# Patient Record
Sex: MALE | Race: WHITE | NOT HISPANIC OR LATINO | Employment: OTHER | URBAN - METROPOLITAN AREA
[De-identification: names, ages, dates, MRNs, and addresses within clinical notes are randomized per-mention and may not be internally consistent; named-entity substitution may affect disease eponyms.]

---

## 2024-02-27 ENCOUNTER — APPOINTMENT (OUTPATIENT)
Dept: CT IMAGING | Facility: HOSPITAL | Age: 79
DRG: 291 | End: 2024-02-27
Payer: OTHER GOVERNMENT

## 2024-02-27 ENCOUNTER — APPOINTMENT (OUTPATIENT)
Dept: GENERAL RADIOLOGY | Facility: HOSPITAL | Age: 79
DRG: 291 | End: 2024-02-27
Payer: OTHER GOVERNMENT

## 2024-02-27 ENCOUNTER — APPOINTMENT (OUTPATIENT)
Dept: CARDIOLOGY | Facility: HOSPITAL | Age: 79
DRG: 291 | End: 2024-02-27
Payer: OTHER GOVERNMENT

## 2024-02-27 ENCOUNTER — HOSPITAL ENCOUNTER (INPATIENT)
Facility: HOSPITAL | Age: 79
LOS: 3 days | Discharge: HOME | DRG: 291 | End: 2024-03-01
Attending: EMERGENCY MEDICINE | Admitting: HOSPITALIST
Payer: OTHER GOVERNMENT

## 2024-02-27 DIAGNOSIS — I50.9 CONGESTIVE HEART FAILURE, UNSPECIFIED HF CHRONICITY, UNSPECIFIED HEART FAILURE TYPE: Primary | ICD-10-CM

## 2024-02-27 PROBLEM — F10.10 ALCOHOL ABUSE: Status: ACTIVE | Noted: 2024-02-27

## 2024-02-27 PROBLEM — I25.10 CORONARY ARTERY DISEASE: Status: ACTIVE | Noted: 2024-02-27

## 2024-02-27 PROBLEM — N17.9 AKI (ACUTE KIDNEY INJURY): Status: ACTIVE | Noted: 2024-02-27

## 2024-02-27 PROBLEM — I10 HYPERTENSION: Status: ACTIVE | Noted: 2024-02-27

## 2024-02-27 LAB
ALBUMIN SERPL-MCNC: 4.4 G/DL (ref 3.5–5.2)
ALBUMIN/GLOB SERPL: 1.6 G/DL
ALP SERPL-CCNC: 90 U/L (ref 39–117)
ALT SERPL W P-5'-P-CCNC: 13 U/L (ref 1–41)
ANION GAP SERPL CALCULATED.3IONS-SCNC: 15.9 MMOL/L (ref 5–15)
AORTIC ARCH: 3.4 CM
AORTIC DIMENSIONLESS INDEX: 0.9 (DI)
ASCENDING AORTA: 3.7 CM
AST SERPL-CCNC: 14 U/L (ref 1–40)
B PARAPERT DNA SPEC QL NAA+PROBE: NOT DETECTED
B PERT DNA SPEC QL NAA+PROBE: NOT DETECTED
BASOPHILS # BLD AUTO: 0.12 10*3/MM3 (ref 0–0.2)
BASOPHILS NFR BLD AUTO: 0.9 % (ref 0–1.5)
BH CV ECHO MEAS - ACS: 1.35 CM
BH CV ECHO MEAS - AO MAX PG: 4.3 MMHG
BH CV ECHO MEAS - AO MEAN PG: 2.7 MMHG
BH CV ECHO MEAS - AO ROOT DIAM: 3.3 CM
BH CV ECHO MEAS - AO V2 MAX: 104.1 CM/SEC
BH CV ECHO MEAS - AO V2 VTI: 14.9 CM
BH CV ECHO MEAS - AVA(I,D): 3 CM2
BH CV ECHO MEAS - EDV(CUBED): 186.3 ML
BH CV ECHO MEAS - EDV(MOD-SP2): 121 ML
BH CV ECHO MEAS - EDV(MOD-SP4): 228 ML
BH CV ECHO MEAS - EF(MOD-BP): 35 %
BH CV ECHO MEAS - EF(MOD-SP2): 21.5 %
BH CV ECHO MEAS - EF(MOD-SP4): 34.6 %
BH CV ECHO MEAS - ESV(CUBED): 135.7 ML
BH CV ECHO MEAS - ESV(MOD-SP2): 95 ML
BH CV ECHO MEAS - ESV(MOD-SP4): 149 ML
BH CV ECHO MEAS - FS: 10 %
BH CV ECHO MEAS - IVS/LVPW: 1.02 CM
BH CV ECHO MEAS - IVSD: 1.24 CM
BH CV ECHO MEAS - LA DIMENSION: 4 CM
BH CV ECHO MEAS - LAT PEAK E' VEL: 12.9 CM/SEC
BH CV ECHO MEAS - LV DIASTOLIC VOL/BSA (35-75): 108.1 CM2
BH CV ECHO MEAS - LV MASS(C)D: 298.4 GRAMS
BH CV ECHO MEAS - LV MAX PG: 2.6 MMHG
BH CV ECHO MEAS - LV MEAN PG: 1.32 MMHG
BH CV ECHO MEAS - LV SYSTOLIC VOL/BSA (12-30): 70.7 CM2
BH CV ECHO MEAS - LV V1 MAX: 80.7 CM/SEC
BH CV ECHO MEAS - LV V1 VTI: 13.4 CM
BH CV ECHO MEAS - LVIDD: 5.7 CM
BH CV ECHO MEAS - LVIDS: 5.1 CM
BH CV ECHO MEAS - LVOT AREA: 3.3 CM2
BH CV ECHO MEAS - LVOT DIAM: 2.05 CM
BH CV ECHO MEAS - LVPWD: 1.21 CM
BH CV ECHO MEAS - MED PEAK E' VEL: 4.5 CM/SEC
BH CV ECHO MEAS - MR MAX PG: 66.8 MMHG
BH CV ECHO MEAS - MR MAX VEL: 408.7 CM/SEC
BH CV ECHO MEAS - MV DEC SLOPE: 718.6 CM/SEC2
BH CV ECHO MEAS - MV DEC TIME: 0.1 SEC
BH CV ECHO MEAS - MV E MAX VEL: 105 CM/SEC
BH CV ECHO MEAS - MV MAX PG: 5.4 MMHG
BH CV ECHO MEAS - MV MEAN PG: 3.6 MMHG
BH CV ECHO MEAS - MV P1/2T: 55.5 MSEC
BH CV ECHO MEAS - MV V2 VTI: 15.1 CM
BH CV ECHO MEAS - MVA(P1/2T): 4 CM2
BH CV ECHO MEAS - MVA(VTI): 2.9 CM2
BH CV ECHO MEAS - PA ACC TIME: 0.13 SEC
BH CV ECHO MEAS - PA V2 MAX: 73.8 CM/SEC
BH CV ECHO MEAS - QP/QS: 3.1
BH CV ECHO MEAS - RV MAX PG: 2.6 MMHG
BH CV ECHO MEAS - RV V1 MAX: 80.2 CM/SEC
BH CV ECHO MEAS - RV V1 VTI: 15.6 CM
BH CV ECHO MEAS - RVOT DIAM: 3.4 CM
BH CV ECHO MEAS - SI(MOD-SP2): 12.3 ML/M2
BH CV ECHO MEAS - SI(MOD-SP4): 37.5 ML/M2
BH CV ECHO MEAS - SUP REN AO DIAM: 2.4 CM
BH CV ECHO MEAS - SV(LVOT): 44.3 ML
BH CV ECHO MEAS - SV(MOD-SP2): 26 ML
BH CV ECHO MEAS - SV(MOD-SP4): 79 ML
BH CV ECHO MEAS - SV(RVOT): 138.3 ML
BH CV ECHO MEAS - TAPSE (>1.6): 2.2 CM
BH CV ECHO MEASUREMENTS AVERAGE E/E' RATIO: 12.07
BH CV XLRA - RV BASE: 3.4 CM
BH CV XLRA - RV LENGTH: 7.8 CM
BH CV XLRA - RV MID: 2.9 CM
BH CV XLRA - TDI S': 14.9 CM/SEC
BILIRUB SERPL-MCNC: 0.6 MG/DL (ref 0–1.2)
BUN SERPL-MCNC: 21 MG/DL (ref 8–23)
BUN/CREAT SERPL: 12.7 (ref 7–25)
C PNEUM DNA NPH QL NAA+NON-PROBE: NOT DETECTED
CALCIUM SPEC-SCNC: 9.4 MG/DL (ref 8.6–10.5)
CHLORIDE SERPL-SCNC: 100 MMOL/L (ref 98–107)
CHOLEST SERPL-MCNC: 241 MG/DL (ref 0–200)
CO2 SERPL-SCNC: 23.1 MMOL/L (ref 22–29)
CREAT SERPL-MCNC: 1.65 MG/DL (ref 0.76–1.27)
D DIMER PPP FEU-MCNC: 1.86 MCGFEU/ML (ref 0–0.78)
D-LACTATE SERPL-SCNC: 1.9 MMOL/L (ref 0.5–2)
DEPRECATED RDW RBC AUTO: 50.5 FL (ref 37–54)
EGFRCR SERPLBLD CKD-EPI 2021: 42.2 ML/MIN/1.73
EOSINOPHIL # BLD AUTO: 0.09 10*3/MM3 (ref 0–0.4)
EOSINOPHIL NFR BLD AUTO: 0.7 % (ref 0.3–6.2)
ERYTHROCYTE [DISTWIDTH] IN BLOOD BY AUTOMATED COUNT: 20.6 % (ref 12.3–15.4)
ETHANOL BLD-MCNC: <10 MG/DL (ref 0–10)
ETHANOL UR QL: <0.01 %
FLUAV RNA RESP QL NAA+PROBE: NOT DETECTED
FLUAV SUBTYP SPEC NAA+PROBE: NOT DETECTED
FLUBV RNA ISLT QL NAA+PROBE: NOT DETECTED
FLUBV RNA RESP QL NAA+PROBE: NOT DETECTED
GEN 5 2HR TROPONIN T REFLEX: 54 NG/L
GLOBULIN UR ELPH-MCNC: 2.8 GM/DL
GLUCOSE BLDC GLUCOMTR-MCNC: 226 MG/DL (ref 70–130)
GLUCOSE BLDC GLUCOMTR-MCNC: 471 MG/DL (ref 70–130)
GLUCOSE SERPL-MCNC: 158 MG/DL (ref 65–99)
HADV DNA SPEC NAA+PROBE: NOT DETECTED
HBA1C MFR BLD: 6.1 % (ref 4.8–5.6)
HCOV 229E RNA SPEC QL NAA+PROBE: NOT DETECTED
HCOV HKU1 RNA SPEC QL NAA+PROBE: NOT DETECTED
HCOV NL63 RNA SPEC QL NAA+PROBE: NOT DETECTED
HCOV OC43 RNA SPEC QL NAA+PROBE: NOT DETECTED
HCT VFR BLD AUTO: 51.8 % (ref 37.5–51)
HDLC SERPL-MCNC: 42 MG/DL (ref 40–60)
HGB BLD-MCNC: 15.4 G/DL (ref 13–17.7)
HMPV RNA NPH QL NAA+NON-PROBE: NOT DETECTED
HPIV1 RNA ISLT QL NAA+PROBE: NOT DETECTED
HPIV2 RNA SPEC QL NAA+PROBE: NOT DETECTED
HPIV3 RNA NPH QL NAA+PROBE: NOT DETECTED
HPIV4 P GENE NPH QL NAA+PROBE: NOT DETECTED
IMM GRANULOCYTES # BLD AUTO: 0.14 10*3/MM3 (ref 0–0.05)
IMM GRANULOCYTES NFR BLD AUTO: 1.1 % (ref 0–0.5)
LDLC SERPL CALC-MCNC: 171 MG/DL (ref 0–100)
LDLC/HDLC SERPL: 4 {RATIO}
LEFT ATRIUM VOLUME INDEX: 32.7 ML/M2
LYMPHOCYTES # BLD AUTO: 0.93 10*3/MM3 (ref 0.7–3.1)
LYMPHOCYTES NFR BLD AUTO: 7 % (ref 19.6–45.3)
M PNEUMO IGG SER IA-ACNC: NOT DETECTED
MAGNESIUM SERPL-MCNC: 1.1 MG/DL (ref 1.6–2.4)
MCH RBC QN AUTO: 22.6 PG (ref 26.6–33)
MCHC RBC AUTO-ENTMCNC: 29.7 G/DL (ref 31.5–35.7)
MCV RBC AUTO: 76.2 FL (ref 79–97)
MONOCYTES # BLD AUTO: 0.8 10*3/MM3 (ref 0.1–0.9)
MONOCYTES NFR BLD AUTO: 6 % (ref 5–12)
NEUTROPHILS NFR BLD AUTO: 11.2 10*3/MM3 (ref 1.7–7)
NEUTROPHILS NFR BLD AUTO: 84.3 % (ref 42.7–76)
NRBC BLD AUTO-RTO: 0 /100 WBC (ref 0–0.2)
NT-PROBNP SERPL-MCNC: ABNORMAL PG/ML (ref 0–1800)
PLATELET # BLD AUTO: 383 10*3/MM3 (ref 140–450)
PMV BLD AUTO: 11.3 FL (ref 6–12)
POTASSIUM SERPL-SCNC: 3.8 MMOL/L (ref 3.5–5.2)
PROCALCITONIN SERPL-MCNC: 0.05 NG/ML (ref 0–0.25)
PROT SERPL-MCNC: 7.2 G/DL (ref 6–8.5)
QT INTERVAL: 324 MS
QTC INTERVAL: 462 MS
RBC # BLD AUTO: 6.8 10*6/MM3 (ref 4.14–5.8)
RHINOVIRUS RNA SPEC NAA+PROBE: NOT DETECTED
RSV RNA NPH QL NAA+NON-PROBE: NOT DETECTED
S PYO AG THROAT QL: NEGATIVE
SARS-COV-2 RNA NPH QL NAA+NON-PROBE: NOT DETECTED
SARS-COV-2 RNA RESP QL NAA+PROBE: NOT DETECTED
SINUS: 3.1 CM
SODIUM SERPL-SCNC: 139 MMOL/L (ref 136–145)
STJ: 3.1 CM
TRIGL SERPL-MCNC: 154 MG/DL (ref 0–150)
TROPONIN T DELTA: -11 NG/L
TROPONIN T SERPL HS-MCNC: 65 NG/L
TSH SERPL DL<=0.05 MIU/L-ACNC: 1.77 UIU/ML (ref 0.27–4.2)
VLDLC SERPL-MCNC: 28 MG/DL (ref 5–40)
WBC NRBC COR # BLD AUTO: 13.28 10*3/MM3 (ref 3.4–10.8)

## 2024-02-27 PROCEDURE — 63710000001 INSULIN DETEMIR PER 5 UNITS: Performed by: HOSPITALIST

## 2024-02-27 PROCEDURE — 93306 TTE W/DOPPLER COMPLETE: CPT | Performed by: INTERNAL MEDICINE

## 2024-02-27 PROCEDURE — G0378 HOSPITAL OBSERVATION PER HR: HCPCS

## 2024-02-27 PROCEDURE — 25510000001 IOPAMIDOL PER 1 ML: Performed by: EMERGENCY MEDICINE

## 2024-02-27 PROCEDURE — 87636 SARSCOV2 & INF A&B AMP PRB: CPT | Performed by: EMERGENCY MEDICINE

## 2024-02-27 PROCEDURE — 80061 LIPID PANEL: CPT | Performed by: HOSPITALIST

## 2024-02-27 PROCEDURE — 93005 ELECTROCARDIOGRAM TRACING: CPT | Performed by: EMERGENCY MEDICINE

## 2024-02-27 PROCEDURE — 84443 ASSAY THYROID STIM HORMONE: CPT | Performed by: HOSPITALIST

## 2024-02-27 PROCEDURE — 25010000002 FUROSEMIDE PER 20 MG: Performed by: EMERGENCY MEDICINE

## 2024-02-27 PROCEDURE — 85379 FIBRIN DEGRADATION QUANT: CPT | Performed by: EMERGENCY MEDICINE

## 2024-02-27 PROCEDURE — 0202U NFCT DS 22 TRGT SARS-COV-2: CPT | Performed by: EMERGENCY MEDICINE

## 2024-02-27 PROCEDURE — 99222 1ST HOSP IP/OBS MODERATE 55: CPT | Performed by: HOSPITALIST

## 2024-02-27 PROCEDURE — 94640 AIRWAY INHALATION TREATMENT: CPT

## 2024-02-27 PROCEDURE — 87040 BLOOD CULTURE FOR BACTERIA: CPT | Performed by: EMERGENCY MEDICINE

## 2024-02-27 PROCEDURE — 93010 ELECTROCARDIOGRAM REPORT: CPT | Performed by: INTERNAL MEDICINE

## 2024-02-27 PROCEDURE — 87880 STREP A ASSAY W/OPTIC: CPT | Performed by: EMERGENCY MEDICINE

## 2024-02-27 PROCEDURE — 36415 COLL VENOUS BLD VENIPUNCTURE: CPT

## 2024-02-27 PROCEDURE — 82948 REAGENT STRIP/BLOOD GLUCOSE: CPT

## 2024-02-27 PROCEDURE — 83036 HEMOGLOBIN GLYCOSYLATED A1C: CPT | Performed by: HOSPITALIST

## 2024-02-27 PROCEDURE — 83735 ASSAY OF MAGNESIUM: CPT | Performed by: HOSPITALIST

## 2024-02-27 PROCEDURE — 83880 ASSAY OF NATRIURETIC PEPTIDE: CPT | Performed by: EMERGENCY MEDICINE

## 2024-02-27 PROCEDURE — 84484 ASSAY OF TROPONIN QUANT: CPT | Performed by: EMERGENCY MEDICINE

## 2024-02-27 PROCEDURE — 82077 ASSAY SPEC XCP UR&BREATH IA: CPT | Performed by: HOSPITALIST

## 2024-02-27 PROCEDURE — 25010000002 DIAZEPAM PER 5 MG: Performed by: HOSPITALIST

## 2024-02-27 PROCEDURE — 93306 TTE W/DOPPLER COMPLETE: CPT

## 2024-02-27 PROCEDURE — 25010000002 THIAMINE HCL 200 MG/2ML SOLUTION: Performed by: HOSPITALIST

## 2024-02-27 PROCEDURE — 87081 CULTURE SCREEN ONLY: CPT | Performed by: EMERGENCY MEDICINE

## 2024-02-27 PROCEDURE — 25010000002 FUROSEMIDE PER 20 MG: Performed by: INTERNAL MEDICINE

## 2024-02-27 PROCEDURE — 25010000002 METHYLPREDNISOLONE PER 125 MG: Performed by: EMERGENCY MEDICINE

## 2024-02-27 PROCEDURE — 83605 ASSAY OF LACTIC ACID: CPT | Performed by: EMERGENCY MEDICINE

## 2024-02-27 PROCEDURE — 80053 COMPREHEN METABOLIC PANEL: CPT | Performed by: EMERGENCY MEDICINE

## 2024-02-27 PROCEDURE — 85025 COMPLETE CBC W/AUTO DIFF WBC: CPT | Performed by: EMERGENCY MEDICINE

## 2024-02-27 PROCEDURE — 71045 X-RAY EXAM CHEST 1 VIEW: CPT

## 2024-02-27 PROCEDURE — 63710000001 INSULIN ASPART PER 5 UNITS: Performed by: HOSPITALIST

## 2024-02-27 PROCEDURE — 25010000002 MAGNESIUM SULFATE 4 GM/100ML SOLUTION: Performed by: HOSPITALIST

## 2024-02-27 PROCEDURE — 99204 OFFICE O/P NEW MOD 45 MIN: CPT | Performed by: INTERNAL MEDICINE

## 2024-02-27 PROCEDURE — 99285 EMERGENCY DEPT VISIT HI MDM: CPT

## 2024-02-27 PROCEDURE — 84145 PROCALCITONIN (PCT): CPT | Performed by: EMERGENCY MEDICINE

## 2024-02-27 PROCEDURE — 25510000001 PERFLUTREN (DEFINITY) 8.476 MG IN SODIUM CHLORIDE (PF) 0.9 % 10 ML INJECTION: Performed by: HOSPITALIST

## 2024-02-27 PROCEDURE — 71275 CT ANGIOGRAPHY CHEST: CPT

## 2024-02-27 RX ORDER — MAGNESIUM SULFATE HEPTAHYDRATE 40 MG/ML
4 INJECTION, SOLUTION INTRAVENOUS ONCE
Status: COMPLETED | OUTPATIENT
Start: 2024-02-27 | End: 2024-02-27

## 2024-02-27 RX ORDER — DEXTROSE MONOHYDRATE 25 G/50ML
10-50 INJECTION, SOLUTION INTRAVENOUS
Status: DISCONTINUED | OUTPATIENT
Start: 2024-02-27 | End: 2024-03-01 | Stop reason: HOSPADM

## 2024-02-27 RX ORDER — NICOTINE POLACRILEX 4 MG
15 LOZENGE BUCCAL
Status: DISCONTINUED | OUTPATIENT
Start: 2024-02-27 | End: 2024-03-01 | Stop reason: HOSPADM

## 2024-02-27 RX ORDER — FLUOXETINE 10 MG/1
30 CAPSULE ORAL DAILY
COMMUNITY
End: 2024-03-01 | Stop reason: HOSPADM

## 2024-02-27 RX ORDER — ATORVASTATIN CALCIUM 40 MG/1
80 TABLET, FILM COATED ORAL NIGHTLY
Status: DISCONTINUED | OUTPATIENT
Start: 2024-02-27 | End: 2024-03-01 | Stop reason: HOSPADM

## 2024-02-27 RX ORDER — FLUOXETINE HYDROCHLORIDE 20 MG/1
60 CAPSULE ORAL DAILY
Status: DISCONTINUED | OUTPATIENT
Start: 2024-02-28 | End: 2024-03-01 | Stop reason: HOSPADM

## 2024-02-27 RX ORDER — LEVETIRACETAM 500 MG/1
500 TABLET ORAL 2 TIMES DAILY
COMMUNITY

## 2024-02-27 RX ORDER — ENOXAPARIN SODIUM 100 MG/ML
40 INJECTION SUBCUTANEOUS NIGHTLY
Status: DISCONTINUED | OUTPATIENT
Start: 2024-02-27 | End: 2024-02-27

## 2024-02-27 RX ORDER — METHYLPREDNISOLONE SODIUM SUCCINATE 125 MG/2ML
125 INJECTION, POWDER, LYOPHILIZED, FOR SOLUTION INTRAMUSCULAR; INTRAVENOUS ONCE
Status: COMPLETED | OUTPATIENT
Start: 2024-02-27 | End: 2024-02-27

## 2024-02-27 RX ORDER — LORAZEPAM 1 MG/1
2 TABLET ORAL
Status: DISCONTINUED | OUTPATIENT
Start: 2024-02-27 | End: 2024-03-01 | Stop reason: HOSPADM

## 2024-02-27 RX ORDER — ALLOPURINOL 100 MG/1
200 TABLET ORAL DAILY
Status: DISCONTINUED | OUTPATIENT
Start: 2024-02-28 | End: 2024-03-01 | Stop reason: HOSPADM

## 2024-02-27 RX ORDER — MIDAZOLAM HYDROCHLORIDE 2 MG/2ML
2 INJECTION, SOLUTION INTRAMUSCULAR; INTRAVENOUS
Status: DISCONTINUED | OUTPATIENT
Start: 2024-02-27 | End: 2024-03-01 | Stop reason: HOSPADM

## 2024-02-27 RX ORDER — LEVOTHYROXINE SODIUM 0.07 MG/1
75 TABLET ORAL
Status: DISCONTINUED | OUTPATIENT
Start: 2024-02-28 | End: 2024-03-01 | Stop reason: HOSPADM

## 2024-02-27 RX ORDER — ATORVASTATIN CALCIUM 80 MG/1
80 TABLET, FILM COATED ORAL DAILY
COMMUNITY

## 2024-02-27 RX ORDER — PANTOPRAZOLE SODIUM 40 MG/1
40 TABLET, DELAYED RELEASE ORAL
Status: DISCONTINUED | OUTPATIENT
Start: 2024-02-28 | End: 2024-03-01 | Stop reason: HOSPADM

## 2024-02-27 RX ORDER — THIAMINE HYDROCHLORIDE 100 MG/ML
200 INJECTION, SOLUTION INTRAMUSCULAR; INTRAVENOUS EVERY 8 HOURS SCHEDULED
Status: DISCONTINUED | OUTPATIENT
Start: 2024-02-27 | End: 2024-03-01 | Stop reason: HOSPADM

## 2024-02-27 RX ORDER — FUROSEMIDE 10 MG/ML
40 INJECTION INTRAMUSCULAR; INTRAVENOUS ONCE
Status: COMPLETED | OUTPATIENT
Start: 2024-02-27 | End: 2024-02-27

## 2024-02-27 RX ORDER — LORAZEPAM 1 MG/1
1 TABLET ORAL
Status: DISCONTINUED | OUTPATIENT
Start: 2024-02-27 | End: 2024-03-01 | Stop reason: HOSPADM

## 2024-02-27 RX ORDER — MIDAZOLAM HYDROCHLORIDE 2 MG/2ML
4 INJECTION, SOLUTION INTRAMUSCULAR; INTRAVENOUS
Status: DISCONTINUED | OUTPATIENT
Start: 2024-02-27 | End: 2024-03-01 | Stop reason: HOSPADM

## 2024-02-27 RX ORDER — GABAPENTIN 300 MG/1
300 CAPSULE ORAL EVERY 8 HOURS SCHEDULED
Status: DISCONTINUED | OUTPATIENT
Start: 2024-02-27 | End: 2024-03-01 | Stop reason: HOSPADM

## 2024-02-27 RX ORDER — COLCHICINE 0.6 MG/1
0.6 TABLET ORAL DAILY
COMMUNITY
End: 2024-03-01 | Stop reason: HOSPADM

## 2024-02-27 RX ORDER — LEVOTHYROXINE SODIUM 0.07 MG/1
75 TABLET ORAL DAILY
COMMUNITY

## 2024-02-27 RX ORDER — GLIPIZIDE 5 MG/1
5 TABLET ORAL DAILY
COMMUNITY

## 2024-02-27 RX ORDER — SODIUM CHLORIDE 0.9 % (FLUSH) 0.9 %
10 SYRINGE (ML) INJECTION AS NEEDED
Status: CANCELLED | OUTPATIENT
Start: 2024-02-27

## 2024-02-27 RX ORDER — LISINOPRIL 20 MG/1
20 TABLET ORAL DAILY
Status: ON HOLD | COMMUNITY
End: 2024-03-01 | Stop reason: SDUPTHER

## 2024-02-27 RX ORDER — CLOPIDOGREL BISULFATE 75 MG/1
75 TABLET ORAL DAILY
COMMUNITY

## 2024-02-27 RX ORDER — CLOPIDOGREL BISULFATE 75 MG/1
75 TABLET ORAL DAILY
Status: DISCONTINUED | OUTPATIENT
Start: 2024-02-28 | End: 2024-03-01 | Stop reason: HOSPADM

## 2024-02-27 RX ORDER — OMEPRAZOLE 20 MG/1
20 CAPSULE, DELAYED RELEASE ORAL DAILY
COMMUNITY
End: 2024-03-01 | Stop reason: HOSPADM

## 2024-02-27 RX ORDER — IPRATROPIUM BROMIDE AND ALBUTEROL SULFATE 2.5; .5 MG/3ML; MG/3ML
3 SOLUTION RESPIRATORY (INHALATION) ONCE
Status: COMPLETED | OUTPATIENT
Start: 2024-02-27 | End: 2024-02-27

## 2024-02-27 RX ORDER — LISINOPRIL 20 MG/1
20 TABLET ORAL
Status: DISCONTINUED | OUTPATIENT
Start: 2024-02-28 | End: 2024-02-29

## 2024-02-27 RX ORDER — HYDROCODONE BITARTRATE AND ACETAMINOPHEN 10; 325 MG/1; MG/1
1 TABLET ORAL EVERY 6 HOURS PRN
Status: DISCONTINUED | OUTPATIENT
Start: 2024-02-27 | End: 2024-03-01 | Stop reason: HOSPADM

## 2024-02-27 RX ORDER — LEVETIRACETAM 500 MG/1
500 TABLET ORAL EVERY 12 HOURS SCHEDULED
Status: DISCONTINUED | OUTPATIENT
Start: 2024-02-27 | End: 2024-03-01 | Stop reason: HOSPADM

## 2024-02-27 RX ORDER — ALLOPURINOL 100 MG/1
100 TABLET ORAL DAILY
COMMUNITY
End: 2024-03-01 | Stop reason: HOSPADM

## 2024-02-27 RX ORDER — FUROSEMIDE 10 MG/ML
80 INJECTION INTRAMUSCULAR; INTRAVENOUS ONCE
Status: COMPLETED | OUTPATIENT
Start: 2024-02-27 | End: 2024-02-27

## 2024-02-27 RX ORDER — LORAZEPAM 1 MG/1
2 TABLET ORAL EVERY 6 HOURS
Status: COMPLETED | OUTPATIENT
Start: 2024-02-27 | End: 2024-02-28

## 2024-02-27 RX ORDER — DIAZEPAM 5 MG/ML
10 INJECTION, SOLUTION INTRAMUSCULAR; INTRAVENOUS ONCE
Status: COMPLETED | OUTPATIENT
Start: 2024-02-27 | End: 2024-02-27

## 2024-02-27 RX ORDER — INSULIN ASPART 100 [IU]/ML
1-200 INJECTION, SOLUTION INTRAVENOUS; SUBCUTANEOUS
Status: DISCONTINUED | OUTPATIENT
Start: 2024-02-27 | End: 2024-03-01 | Stop reason: HOSPADM

## 2024-02-27 RX ORDER — NITROGLYCERIN 0.4 MG/1
0.4 TABLET SUBLINGUAL
Status: DISCONTINUED | OUTPATIENT
Start: 2024-02-27 | End: 2024-03-01 | Stop reason: HOSPADM

## 2024-02-27 RX ORDER — GABAPENTIN 300 MG/1
300 CAPSULE ORAL 3 TIMES DAILY
COMMUNITY

## 2024-02-27 RX ORDER — GLIPIZIDE 5 MG/1
5 TABLET ORAL
Status: DISCONTINUED | OUTPATIENT
Start: 2024-02-28 | End: 2024-03-01 | Stop reason: HOSPADM

## 2024-02-27 RX ORDER — FOLIC ACID 1 MG/1
1 TABLET ORAL DAILY
Status: DISCONTINUED | OUTPATIENT
Start: 2024-02-27 | End: 2024-03-01 | Stop reason: HOSPADM

## 2024-02-27 RX ORDER — IBUPROFEN 600 MG/1
1 TABLET ORAL
Status: DISCONTINUED | OUTPATIENT
Start: 2024-02-27 | End: 2024-03-01 | Stop reason: HOSPADM

## 2024-02-27 RX ORDER — LORAZEPAM 1 MG/1
1 TABLET ORAL EVERY 6 HOURS
Status: DISCONTINUED | OUTPATIENT
Start: 2024-02-28 | End: 2024-02-29

## 2024-02-27 RX ORDER — INSULIN ASPART 100 [IU]/ML
1-200 INJECTION, SOLUTION INTRAVENOUS; SUBCUTANEOUS AS NEEDED
Status: DISCONTINUED | OUTPATIENT
Start: 2024-02-27 | End: 2024-03-01 | Stop reason: HOSPADM

## 2024-02-27 RX ADMIN — RIVAROXABAN 10 MG: 10 TABLET, FILM COATED ORAL at 18:59

## 2024-02-27 RX ADMIN — FUROSEMIDE 80 MG: 10 INJECTION INTRAMUSCULAR; INTRAVENOUS at 12:37

## 2024-02-27 RX ADMIN — LEVETIRACETAM 500 MG: 500 TABLET, FILM COATED ORAL at 20:38

## 2024-02-27 RX ADMIN — FUROSEMIDE 40 MG: 10 INJECTION, SOLUTION INTRAMUSCULAR; INTRAVENOUS at 20:39

## 2024-02-27 RX ADMIN — ATORVASTATIN CALCIUM 80 MG: 40 TABLET, FILM COATED ORAL at 20:38

## 2024-02-27 RX ADMIN — THIAMINE HYDROCHLORIDE 200 MG: 100 INJECTION, SOLUTION INTRAMUSCULAR; INTRAVENOUS at 21:00

## 2024-02-27 RX ADMIN — INSULIN ASPART 12 UNITS: 100 INJECTION, SOLUTION INTRAVENOUS; SUBCUTANEOUS at 18:59

## 2024-02-27 RX ADMIN — IPRATROPIUM BROMIDE AND ALBUTEROL SULFATE 3 ML: .5; 3 SOLUTION RESPIRATORY (INHALATION) at 10:09

## 2024-02-27 RX ADMIN — IOPAMIDOL 100 ML: 755 INJECTION, SOLUTION INTRAVENOUS at 11:47

## 2024-02-27 RX ADMIN — THIAMINE HYDROCHLORIDE 200 MG: 100 INJECTION, SOLUTION INTRAMUSCULAR; INTRAVENOUS at 15:35

## 2024-02-27 RX ADMIN — DIAZEPAM 10 MG: 10 INJECTION, SOLUTION INTRAMUSCULAR; INTRAVENOUS at 16:38

## 2024-02-27 RX ADMIN — METHYLPREDNISOLONE SODIUM SUCCINATE 125 MG: 125 INJECTION, POWDER, FOR SOLUTION INTRAMUSCULAR; INTRAVENOUS at 10:23

## 2024-02-27 RX ADMIN — SODIUM CHLORIDE 2 ML: 9 INJECTION INTRAMUSCULAR; INTRAVENOUS; SUBCUTANEOUS at 15:26

## 2024-02-27 RX ADMIN — FOLIC ACID 1 MG: 1 TABLET ORAL at 15:34

## 2024-02-27 RX ADMIN — LORAZEPAM 2 MG: 1 TABLET ORAL at 21:00

## 2024-02-27 RX ADMIN — INSULIN DETEMIR 23 UNITS: 100 INJECTION, SOLUTION SUBCUTANEOUS at 21:41

## 2024-02-27 RX ADMIN — GABAPENTIN 300 MG: 300 CAPSULE ORAL at 21:00

## 2024-02-27 RX ADMIN — LORAZEPAM 2 MG: 1 TABLET ORAL at 15:32

## 2024-02-27 RX ADMIN — MAGNESIUM SULFATE 4 G: 4 INJECTION INTRAVENOUS at 20:38

## 2024-02-27 NOTE — ED PROVIDER NOTES
Subjective   History of Present Illness    Chief complaint: Short of air    Location: Home    Quality/Severity: Moderate    Timing/Onset/Duration: Over the last 2 days, worse last night    Modifying Factors: Nothing makes it better    Associated Symptoms: No headache.  No fever or chills.  Patient's had a cough productive of some mild amount of sputum that is yellow in color.  Patient complains of a sore throat on the left side of his neck after dental extraction several weeks ago.  It is only mild in intensity.  No chest pain.  No abdominal pain.  No diarrhea or burning when he urinates.  No nausea or vomiting.    Narrative: This 78-year-old white male who has a history of smoking, he states he quit 1 week ago, presents with shortness of breath of 2 days duration.  He denies diagnosis of COPD or emphysema or asthma.  He is not on home oxygen.    PCP: [facility]    Review of Systems   Constitutional:  Negative for chills and fever.   HENT:  Positive for congestion and sore throat.    Respiratory:  Positive for cough and shortness of breath.    Cardiovascular:  Negative for chest pain.   Gastrointestinal:  Negative for abdominal pain, diarrhea, nausea and vomiting.   Genitourinary:  Negative for flank pain.   Musculoskeletal:  Negative for back pain.   Skin:  Negative for rash.   Neurological:  Negative for headaches.         No past medical history on file.    No Known Allergies    No past surgical history on file.    No family history on file.    Social History     Socioeconomic History    Marital status:    Tobacco Use    Smoking status: Every Day     Types: Cigarettes    Smokeless tobacco: Never   Vaping Use    Vaping Use: Never used   Substance and Sexual Activity    Alcohol use: Not Currently    Drug use: Defer    Sexual activity: Defer           Objective   Physical Exam  Vitals (The temperature is 97.6 °F, pulse 122, respirations 36, /117, room air pulse ox 95%.) and nursing note reviewed.    Constitutional:       Appearance: Normal appearance.   HENT:      Head: Normocephalic and atraumatic.      Right Ear: Tympanic membrane normal.      Left Ear: Tympanic membrane normal.      Nose: Nose normal.      Mouth/Throat:      Mouth: Mucous membranes are moist.   Cardiovascular:      Rate and Rhythm: Normal rate and regular rhythm.      Pulses: Normal pulses.      Heart sounds: Normal heart sounds. No murmur heard.     No friction rub. No gallop.   Pulmonary:      Effort: No respiratory distress.      Breath sounds: No stridor. No wheezing, rhonchi or rales.      Comments: Decreased breath sounds bilaterally  Chest:      Chest wall: No tenderness.   Abdominal:      General: Abdomen is flat. Bowel sounds are normal. There is no distension.      Palpations: Abdomen is soft. There is no mass.      Tenderness: There is no abdominal tenderness. There is no right CVA tenderness, left CVA tenderness, guarding or rebound.      Hernia: No hernia is present.   Musculoskeletal:      Cervical back: Normal range of motion and neck supple.   Neurological:      Mental Status: He is alert.         Procedures           ED Course  ED Course as of 02/27/24 1224   Tue Feb 27, 2024   1043 The high-sensitivity troponin is 65 and elevated. [RC]   1043 Blood cell count is 13.2, hemoglobin 15, hematocrit 51, platelet count 383,000, neutrophil percent 84%.  Absolute neutrophil count 11.2, CBC is otherwise unremarkable. [RC]   1044 The COVID flu is negative. [RC]   1044 The glucose is 158, creatinine 1.65, GFR 42, CMP is otherwise unremarkable [RC]   1044 The D-dimer is elevated at 1.86. [RC]   1109 The proBNP is 13,365 and elevated. [RC]   1156 Lactic acid is 1.9 and normal. [RC]   1157 Rapid strep screen is negative. [RC]   1157 The procalcitonin is 0.05 and normal. [RC]   1215 The 2-hour troponin is 54 with a delta T of -11. [RC]      ED Course User Index  [RC] José Miguel Sparks MD      09:47 EST, 02/27/24:  The EKG was obtained  at 942 and read by me at 943.  EKG shows a sinus tachycardia with rate of 122.  There is a normal axis with no hypertrophy.  The TN, QRS, and QT intervals are unremarkable.  There is no ectopy.  There is poor anterior R wave progression.  There is mild T wave flattening and inversion laterally.  There is no acute ST elevation.  There is artifact in multiple leads.                             12:24 EST, 02/27/24:  The patient was reassessed.  He has no new complaints.  He states that he is still short of breath.  His vital signs reviewed and are stable.  Lung exam: Clear to auscultation equal bilaterally.    12:25 EST, 02/27/24:  The patient's diagnosis CHF and thoracic aneurysm was discussed with him.    12:25 EST, 02/27/24:  [facility] has been paged out.  Dr. Good returned call and will consult on the patient.  She is asked to the hospitalist admit.    12:36 EST, 02/27/24:  [facility]    12:36 EST, 02/27/24:  Dr. Vieira, on-call for the hospitalist has been paged out.  He will bring the patient in for observation.    12:50 EST, 02/27/24:  Dr. Good has seen the patient.  The patient now indicates that [facility].  [facility].  [facility] and [facility].  We will admit the patient.                Medical Decision Making      Final diagnoses:   Congestive heart failure, unspecified HF chronicity, unspecified heart failure type       ED Disposition  ED Disposition       None            No follow-up provider specified.       Medication List      No changes were made to your prescriptions during this visit.            José Miguel Sparks MD  02/27/24 1242       José Miguel Sparks MD  02/27/24 0610

## 2024-02-27 NOTE — PLAN OF CARE
Goal Outcome Evaluation:  Plan of Care Reviewed With: patient        Progress: no change  Outcome Evaluation: new admit , CHF    Lasix given I as ordered. 1 Liter UOP thus far. Soa with activity. Noted ETOH daily use 6-7 drinks. CIWA initiated and tachycardia monitored. Memory recall is not well, does not know home med list. Family carmelo to bring is as they are locked in a safe. [facility].

## 2024-02-27 NOTE — ED NOTES
Care of pt resumed. Per Dr. Sparks, after eval by cardiologist, pt [facility]. Pending [facility] back from [facility]. Bed assignment at [facility] recived but admission on hold until further info received.

## 2024-02-27 NOTE — CONSULTS
"Date of Hospital Visit: 24  Encounter Provider: Mariya Mcneill RN  Place of Service: [facility] CARDIOLOGY  Patient Name: Sheron Han  :1945  Referral Provider: Dr Sparks    Chief complaint: increasing SOA    History of Present Illness     Mr Sheron Han is a 78 year old man who follows at the [facility]. I have requested records for review but haven't received them yet. [facility] but does not know her name. He is a very difficult historian. 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 He has a reported history of CAD and underwent coronary angiography at [facility] in 2022; I have no details but he says he received a stent. He is a heavy cigarette smoker and drinks a lot of alcohol. He was admitted to [facility] in  with encephalopathy requiring intubation; he was diagnosed with PRES and uncontrolled HTN and alcohol withdrawal.  He pretty much only drinks Fireball and Diet Mountain Dew.     For the last week, he's had progressive SOA which has progressed to dyspnea at rest, as well as orthopnea or PND. He's not had chest pain/pressure. He has had wheezing and a productive cough. He denies fever. He has been \"holding on to water\" in his feet and abdomen.     His BP was 155/117mm Hg upon arrival. He's in sinus tach, HR ~120 bpm. His O2 sat is 94% on room air. His dimer was elevated but a CTA was negative for PE. It shows bilateral effusions and scattered edema. His hsTn is 65-54, proBNP is 13K. His Cr is 1.65, up from 1 in .     ADDENDUM: Notes received from 2022 from [facility]. He presented with an inferior STEMI. Cronary angiography revealed complete RCA occlusion s/p PCI (4x38mm Xience). There was an 80% LAD lesion which was treated with a 3.5x38mm Xience. His EF was 45% with inferior hypokinesis.       ECHO 10/25/21  Physician Conclusions   Any " valve disease noted in the report is non-rheumatic unless otherwise specifically noted.   Summary:                 The left ventricular chamber size, wall thickness, and systolic function are within normal limits. There are                            no regional wall motion abnormalities observed.                            The ejection fraction biplane was calculated at 52%.                            The right ventricular chamber size and systolic function are within normal limits.                            There is aortic valve sclerosis without evidence of stenosis.                            Trace-to-mild aortic regurgitation is noted.     Past Medical History:   Diagnosis Date    Alcohol withdrawal     Chronic pain     Coronary artery disease     Hypertension     Metabolic encephalopathy        History reviewed. No pertinent surgical history.    Prior to Admission medications    Medication Sig Start Date End Date Taking? Authorizing Provider   Continuous Blood Gluc Sensor (FreeStyle Kris 2 Sensor) misc USE AS DIRECTED ONE SENSOR EVERY 14 DAYS 23   ProviderLacho MD   HYDROcodone-acetaminophen (NORCO)  MG per tablet Take 1 tablet by mouth Every 6 (Six) Hours As Needed for Moderate Pain.    Provider, MD Lacho       Social History     Socioeconomic History    Marital status:    Tobacco Use    Smoking status: Former     Packs/day: 1.00     Years: 25.00     Additional pack years: 0.00     Total pack years: 25.00     Types: Cigarettes     Quit date: 2024     Years since quittin.0    Smokeless tobacco: Never   Vaping Use    Vaping Use: Never used   Substance and Sexual Activity    Alcohol use: Not Currently    Drug use: Defer    Sexual activity: Defer       History reviewed. No pertinent family history.    Review of Systems   Constitutional:  Positive for fatigue.   Respiratory:  Positive for shortness of breath and wheezing.    Cardiovascular:  Positive for leg swelling.  "  Gastrointestinal:  Positive for abdominal distention.   All other systems reviewed and are negative.       Objective:     Vitals:    02/27/24 1017 02/27/24 1030 02/27/24 1107 02/27/24 1130   BP:  134/93 143/94 140/100   BP Location:  Right arm Right arm Right arm   Patient Position:  Sitting Sitting Sitting   Pulse: (!) 122 118 120 120   Resp: 20 28 28 22   Temp:       TempSrc:       SpO2: 96% 95% 97% 94%   Weight:       Height:         Body mass index is 27.89 kg/m².  Flowsheet Rows      Flowsheet Row First Filed Value   Admission Height 180.3 cm (71\") Documented at 02/27/2024 0937   Admission Weight 90.7 kg (200 lb) Documented at 02/27/2024 0937            Physical Exam  Constitutional:       General: He is not in acute distress.  HENT:      Mouth/Throat:      Pharynx: Oropharynx is clear.      Comments: Edentulous  Eyes:      Conjunctiva/sclera: Conjunctivae normal.   Neck:      Vascular: JVD present.   Cardiovascular:      Rate and Rhythm: Regular rhythm. Tachycardia present.      Heart sounds:      Gallop present.   Pulmonary:      Effort: Pulmonary effort is normal.      Comments: Fine rales at bases  Abdominal:      General: There is distension.      Palpations: Abdomen is soft.   Musculoskeletal:         General: Swelling (trace-1+) present.   Neurological:      General: No focal deficit present.   Psychiatric:         Cognition and Memory: He exhibits impaired remote memory.                 Lab Review:                Results from last 7 days   Lab Units 02/27/24  0942   SODIUM mmol/L 139   POTASSIUM mmol/L 3.8   CHLORIDE mmol/L 100   CO2 mmol/L 23.1   BUN mg/dL 21   CREATININE mg/dL 1.65*   GLUCOSE mg/dL 158*   CALCIUM mg/dL 9.4     Results from last 7 days   Lab Units 02/27/24  1136 02/27/24  0942   HSTROP T ng/L 54* 65*     Results from last 7 days   Lab Units 02/27/24  0942   WBC 10*3/mm3 13.28*   HEMOGLOBIN g/dL 15.4   HEMATOCRIT % 51.8*   PLATELETS 10*3/mm3 383                                       I " personally viewed and interpreted the patient's EKG/Telemetry data    Assessment/Plan:     1. SOA  2. Acute HF, undetermined type  3. Sinus tachycardia  4. Poorly controlled HTN  5. Alcohol abuse  6. Tobacco abuse  7. KAREN    He has bilateral pleural effusions and scattered pulmonary edema.  Interestingly, he is not really hypoxic.  His proBNP is elevated.  He has sinus tachycardia and gallop.  I am worried that he has a cardiomyopathy, potentially due to hypertension, coronary disease, or alcohol, or any combination of the three.  He has just received a dose of furosemide and we will see how he responds.  [facility], and I have asked nursing staff to call the [facility] to get a home medication list.    He denies angina.  His troponin was mildly elevated but is decreasing.  Overall, his clinical picture is not consistent with primary acute coronary syndrome.  He has a history of stenting in 2022 and I have requested those records.    He wishes to be transferred to the [facility], and attempt to be made to do so, although if they do not have any beds, he will be admitted here and we will follow.  I will order an echocardiogram if he is going to stay here.  I cannot really recommend any medication adjustments or changes until I have his home medication list.      ADD:    Records received from [facility], note amended in HPI.

## 2024-02-27 NOTE — H&P
"[facility] HOSPITALIST     Provider, No Known    CHIEF COMPLAINT: \"I just couldn't breathe\"    HISTORY OF PRESENT ILLNESS:    Mr. Han is a pleasant, 79 y/o  male who presents w/ an approximate 4 ay history of worsening exertional dyspnea w/ symptoms of orthopnea and PND over the last 24 hours.  [facility]  He is followed by Cardiology there as well.  He reports that he began noticing some leg swelling last year, and amongst his many medications, he was al started on a \"water pill\".  He report no recent swelling.  He denies chest pain.  He was supposed to accompany his granddaughter to procure her learner's permit today, but he was so dyspneic he came to the ER.  He has had increased stress at home as well which has led to him to return to drinking Vodka and Fireball with his last drink being yesterday.  He denies nausea and vomiting.  He has had no diarrhea.  He denies fever and chills.  He has noted a slight wheeze associated with his dyspnea.      Past Medical History:   Diagnosis Date    Alcohol withdrawal     Chronic pain     Coronary artery disease     Hypertension     Metabolic encephalopathy      History reviewed. No pertinent surgical history.  History reviewed. No pertinent family history.  Social History     Tobacco Use    Smoking status: Former     Packs/day: 1.00     Years: 25.00     Additional pack years: 0.00     Total pack years: 25.00     Types: Cigarettes     Quit date: 2024     Years since quittin.0    Smokeless tobacco: Never   Vaping Use    Vaping Use: Never used   Substance Use Topics    Alcohol use: Not Currently    Drug use: Defer     Medications Prior to Admission   Medication Sig Dispense Refill Last Dose    HYDROcodone-acetaminophen (NORCO)  MG per tablet Take 1 tablet by mouth Every 6 (Six) Hours As Needed for Moderate Pain.   2024    Continuous Blood Gluc Sensor (FreeStyle Kris 2 Sensor) misc USE AS DIRECTED ONE " "SENSOR EVERY 14 DAYS        Allergies:  Patient has no known allergies.    REVIEW OF SYSTEMS:  Please see the above history of present illness for pertinent positives and negatives.  The remainder of the patient's systems have been reviewed and are negative.    Vital Signs  Temp:  [97.6 °F (36.4 °C)-97.9 °F (36.6 °C)] 97.6 °F (36.4 °C)  Heart Rate:  [117-126] 124  Resp:  [20-36] 22  BP: (126-156)/() 126/89  Oxygen Therapy  SpO2: 94 %  Pulse Oximetry Type: Continuous  Device (Oxygen Therapy): room air}  Body mass index is 27.89 kg/m².    Flowsheet Rows      Flowsheet Row First Filed Value   Admission Height 180.3 cm (71\") Documented at 02/27/2024 0937   Admission Weight 90.7 kg (200 lb) Documented at 02/27/2024 0937             Physical Exam:  Physical Exam   Constitutional: Patient appears well developed and well nourished and in no acute distress.  Appears younger than stated age.   HEENT:   Head: Normocephalic and atraumatic.   Eyes:  Pupils are equal, round, and reactive to light. EOM are intact. Sclerae are anicteric and noninjected.  Mouth and Throat: Patient has moist mucous membranes. Oropharynx is clear of any erythema or exudate.     Neck: Neck supple. No JVD present. No thyromegaly present. No lymphadenopathy present.  Cardiovascular: Regular rate, regular rhythm, S1 normal and S2 normal.  Exam reveals no gallop and no friction rub.  No murmur heard.  Radial pulses are 2+ and symmetric.  Pulmonary/Chest: Lungs are clear to auscultation bilaterally. No respiratory distress. No wheezes. No rhonchi. No rales.   Abdominal: Soft. Bowel sounds are normal. No distension and no mass. There is no tenderness.   Musculoskeletal: Normal muscle tone  Extremities: No edema.   Neurological: Patient is alert and oriented to person, place, and time. Cranial nerves II-XII are grossly intact with no focal deficits.  Skin: Skin is warm. No rash noted. Nails show no clubbing.  No cyanosis or erythema.    Emotional " Behavior:    Judgment and Insight: Poor   Mental Status:  Alertness  Normal   Memory:  Appears intact   Mood and Affect:         Depression  None               Anxiety  None    Debilities:   Physical Weakness  None   Handicaps  None   Disabilities  None   Agitation  None     Results Review:    I reviewed the patient's new clinical results.  Lab Results (most recent)       Procedure Component Value Units Date/Time    Ethanol [[id number]] Collected: 02/27/24 1136    Specimen: Blood from Arm, Right Updated: 02/27/24 1537     Ethanol <10 mg/dL      Ethanol % <0.010 %     Lipid Panel [[id number]]  (Abnormal) Collected: 02/27/24 1136    Specimen: Blood from Arm, Right Updated: 02/27/24 1537     Total Cholesterol 241 mg/dL      Triglycerides 154 mg/dL      HDL Cholesterol 42 mg/dL      LDL Cholesterol  171 mg/dL      VLDL Cholesterol 28 mg/dL      LDL/HDL Ratio 4.00    Narrative:      Cholesterol Reference Ranges  (U.S. Department of Health and Human Services ATP III Classifications)    Desirable          <200 mg/dL  Borderline High    200-239 mg/dL  High Risk          >240 mg/dL      Triglyceride Reference Ranges  (U.S. Department of Health and Human Services ATP III Classifications)    Normal           <150 mg/dL  Borderline High  150-199 mg/dL  High             200-499 mg/dL  Very High        >500 mg/dL    HDL Reference Ranges  (U.S. Department of Health and Human Services ATP III Classifications)    Low     <40 mg/dl (major risk factor for CHD)  High    >60 mg/dl ('negative' risk factor for CHD)        LDL Reference Ranges  (U.S. Department of Health and Human Services ATP III Classifications)    Optimal          <100 mg/dL  Near Optimal     100-129 mg/dL  Borderline High  130-159 mg/dL  High             160-189 mg/dL  Very High        >189 mg/dL    High Sensitivity Troponin T 2Hr [[id number]]  (Abnormal) Collected: 02/27/24 1136    Specimen: Blood from Arm, Right Updated: 02/27/24 1207     HS Troponin T 54 ng/L       "Troponin T Delta -11 ng/L     Narrative:      High Sensitive Troponin T Reference Range:  <14.0 ng/L- Negative Female for AMI  <22.0 ng/L- Negative Male for AMI  >=14 - Abnormal Female indicating possible myocardial injury.  >=22 - Abnormal Male indicating possible myocardial injury.   Clinicians would have to utilize clinical acumen, EKG, Troponin, and serial changes to determine if it is an Acute Myocardial Infarction or myocardial injury due to an underlying chronic condition.         Lactic Acid, Plasma [[id number]]  (Normal) Collected: 02/27/24 1056    Specimen: Blood from Arm, Left Updated: 02/27/24 1147     Lactate 1.9 mmol/L     Blood Culture - Blood, Arm, Left [[id number]] Collected: 02/27/24 1056    Specimen: Blood from Arm, Left Updated: 02/27/24 1136    Blood Culture - Blood, Arm, Right [[id number]] Collected: 02/27/24 1106    Specimen: Blood from Arm, Right Updated: 02/27/24 1136    Procalcitonin [[id number]]  (Normal) Collected: 02/27/24 0942    Specimen: Blood Updated: 02/27/24 1118     Procalcitonin 0.05 ng/mL     Narrative:      As a Marker for Sepsis (Non-Neonates):    1. <0.5 ng/mL represents a low risk of severe sepsis and/or septic shock.  2. >2 ng/mL represents a high risk of severe sepsis and/or septic shock.    As a Marker for Lower Respiratory Tract Infections that require antibiotic therapy:    PCT on Admission    Antibiotic Therapy       6-12 Hrs later    >0.5                Strongly Recommended  >0.25 - <0.5        Recommended   0.1 - 0.25          Discouraged              Remeasure/reassess PCT  <0.1                Strongly Discouraged     Remeasure/reassess PCT    As 28 day mortality risk marker: \"Change in Procalcitonin Result\" (>80% or <=80%) if Day 0 (or Day 1) and Day 4 values are available. Refer to http://www.[facility]-pct-calculator.com    Change in PCT <=80%  A decrease of PCT levels below or equal to 80% defines a positive change in PCT test result representing a higher risk for " 28-day all-cause mortality of patients diagnosed with severe sepsis for septic shock.    Change in PCT >80%  A decrease of PCT levels of more than 80% defines a negative change in PCT result representing a lower risk for 28-day all-cause mortality of patients diagnosed with severe sepsis or septic shock.       BNP [[id number]]  (Abnormal) Collected: 02/27/24 0942    Specimen: Blood Updated: 02/27/24 1106     proBNP 13,365.0 pg/mL     Narrative:      This assay is used as an aid in the diagnosis of individuals suspected of having heart failure. It can be used as an aid in the diagnosis of acute decompensated heart failure (ADHF) in patients presenting with signs and symptoms of ADHF to the emergency department (ED). In addition, NT-proBNP of <300 pg/mL indicates ADHF is not likely.    Age Range Result Interpretation  NT-proBNP Concentration (pg/mL:      <50             Positive            >450                   Gray                 300-450                    Negative             <300    50-75           Positive            >900                  Gray                300-900                  Negative            <300      >75             Positive            >1800                  Gray                300-1800                  Negative            <300    Respiratory Panel PCR w/COVID-19(SARS-CoV-2) BLANCO/JUAN/NAHEDE/PAD/COR/CHRISTINE In-House, NP Swab in UTM/VTM, 2 HR TAT - Swab, Nasopharynx [[id number]] Collected: 02/27/24 1050    Specimen: Swab from Nasopharynx Updated: 02/27/24 1050    Rapid Strep A Screen - Swab, Throat [[id number]]  (Normal) Collected: 02/27/24 1001    Specimen: Swab from Throat Updated: 02/27/24 1047     Strep A Ag Negative    Beta Strep Culture, Throat - Swab, Throat [[id number]] Collected: 02/27/24 1001    Specimen: Swab from Throat Updated: 02/27/24 1047    High Sensitivity Troponin T [[id number]]  (Abnormal) Collected: 02/27/24 0942    Specimen: Blood Updated: 02/27/24 1032     HS Troponin T 65 ng/L     Narrative:       High Sensitive Troponin T Reference Range:  <14.0 ng/L- Negative Female for AMI  <22.0 ng/L- Negative Male for AMI  >=14 - Abnormal Female indicating possible myocardial injury.  >=22 - Abnormal Male indicating possible myocardial injury.   Clinicians would have to utilize clinical acumen, EKG, Troponin, and serial changes to determine if it is an Acute Myocardial Infarction or myocardial injury due to an underlying chronic condition.         CBC & Differential [[id number]]  (Abnormal) Collected: 02/27/24 0942    Specimen: Blood Updated: 02/27/24 1031    Narrative:      The following orders were created for panel order CBC & Differential.  Procedure                               Abnormality         Status                     ---------                               -----------         ------                     CBC Auto Differential[[id number]]        Abnormal            Final result                 Please view results for these tests on the individual orders.    CBC Auto Differential [[id number]]  (Abnormal) Collected: 02/27/24 0942    Specimen: Blood Updated: 02/27/24 1031     WBC 13.28 10*3/mm3      RBC 6.80 10*6/mm3      Hemoglobin 15.4 g/dL      Hematocrit 51.8 %      MCV 76.2 fL      MCH 22.6 pg      MCHC 29.7 g/dL      RDW 20.6 %      RDW-SD 50.5 fl      MPV 11.3 fL      Platelets 383 10*3/mm3      Neutrophil % 84.3 %      Lymphocyte % 7.0 %      Monocyte % 6.0 %      Eosinophil % 0.7 %      Basophil % 0.9 %      Immature Grans % 1.1 %      Neutrophils, Absolute 11.20 10*3/mm3      Lymphocytes, Absolute 0.93 10*3/mm3      Monocytes, Absolute 0.80 10*3/mm3      Eosinophils, Absolute 0.09 10*3/mm3      Basophils, Absolute 0.12 10*3/mm3      Immature Grans, Absolute 0.14 10*3/mm3      nRBC 0.0 /100 WBC     COVID-19 and FLU A/B PCR, 1 HR TAT - Swab, Nasopharynx [[id number]]  (Normal) Collected: 02/27/24 0942    Specimen: Swab from Nasopharynx Updated: 02/27/24 1017     COVID19 Not Detected     Influenza A PCR Not  Detected     Influenza B PCR Not Detected    Narrative:      Fact sheet for providers: https://www.fda.gov/media/636618/download    Fact sheet for patients: https://www.fda.gov/media/819377/download    Test performed by PCR.    Comprehensive Metabolic Panel [[id number]]  (Abnormal) Collected: 02/27/24 0942    Specimen: Blood Updated: 02/27/24 1015     Glucose 158 mg/dL      BUN 21 mg/dL      Creatinine 1.65 mg/dL      Sodium 139 mmol/L      Potassium 3.8 mmol/L      Chloride 100 mmol/L      CO2 23.1 mmol/L      Calcium 9.4 mg/dL      Total Protein 7.2 g/dL      Albumin 4.4 g/dL      ALT (SGPT) 13 U/L      AST (SGOT) 14 U/L      Alkaline Phosphatase 90 U/L      Total Bilirubin 0.6 mg/dL      Globulin 2.8 gm/dL      A/G Ratio 1.6 g/dL      BUN/Creatinine Ratio 12.7     Anion Gap 15.9 mmol/L      eGFR 42.2 mL/min/1.73     Narrative:      GFR Normal >60  Chronic Kidney Disease <60  Kidney Failure <15    The GFR formula is only valid for adults with stable renal function between ages 18 and 70.    D-dimer, Quantitative [[id number]]  (Abnormal) Collected: 02/27/24 0942    Specimen: Blood Updated: 02/27/24 1013     D-Dimer, Quantitative 1.86 MCGFEU/mL     Narrative:      According to the assay 's published package insert, a normal (<0.50 MCGFEU/mL) D-dimer result in conjunction with a non-high clinical probability assessment, excludes deep vein thrombosis (DVT) and pulmonary embolism (PE) with high sensitivity.    D-dimer values increase with age and this can make VTE exclusion of an older population difficult. To address this, the [employer], based on best available evidence and recent guidelines, recommends that clinicians use age-adjusted D-dimer thresholds in patients greater than 50 years of age with: a) a low probability of PE who do not meet all Pulmonary Embolism Rule Out Criteria, or b) in those with intermediate probability of PE.   The formula for an age-adjusted D-dimer cut-off is  "\"age/100\".  For example, a 60 year old patient would have an age-adjusted cut-off of 0.60 MCGFEU/mL and an 80 year old 0.80 MCGFEU/mL.            Imaging Results (Most Recent)       Procedure Component Value Units Date/Time    CT Angiogram Chest [[id number]] Collected: 02/27/24 1200     Updated: 02/27/24 1221    Narrative:      CT ANGIOGRAM CHEST    Date of Exam: 2/27/2024 11:44 AM EST    Indication: Short of air, elevated D-dimer.    Comparison: None available.    Technique: CTA of the chest was performed after the uneventful intravenous administration of iodinated contrast. Reconstructed coronal and sagittal images were also obtained. In addition, a 3-D volume rendered image was created for interpretation.   Automated exposure control and iterative reconstruction methods were used.    FINDINGS:  No significant focal filling defects are identified to indicate the presence of pulmonary embolism.    There are ill-defined multifocal groundglass infiltrates throughout the lungs bilaterally with diffuse interstitial changes. Bilateral pleural effusions are also noted. Somewhat more pronounced infiltrates are noted at the medial aspect of the right   upper lobe and within the bilateral lower lobes. The findings suggest developing atypical/viral infection or multifocal pneumonia. Changes secondary to CHF and pulmonary edema may also be considered.    Bilateral hilar and mediastinal lymphadenopathy is observed. These changes may be reactive in nature. Aortic atherosclerosis is noted. There is dilatation of the ascending thoracic aorta measuring 4.0 cm indicating an ascending thoracic aortic aneurysm.   Severe coronary artery calcifications are observed. The thyroid gland is unremarkable. The esophagus is unremarkable.    The limited evaluation of the upper abdomen demonstrates no evidence for acute abnormality.    There is severe age-indeterminate compression fracture deformity of the T12 vertebral body with anterior wedge " deformity and biconcave deformity. There is mild retropulsion of the posterior superior endplate fracture fragment. No significant associated   malalignment is observed.      Impression:      1.No evidence for pulmonary embolism.  2.Extensive multifocal diffuse airspace infiltrates and diffuse interstitial changes throughout the lungs bilaterally. Associated pleural effusions are observed. The findings suggest changes of potential developing atypical/viral infection or multifocal   pneumonia. Changes of CHF and pulmonary edema may also contribute to these findings. Recommend correlation for signs or symptoms of acute infection and follow-up to ensure improvement/resolution.  3.Bilateral hilar and mediastinal lymphadenopathy likely reactive in nature. Recommend follow-up to ensure resolution.  4.Severe coronary artery calcifications are noted.  5.An ascending thoracic aortic aneurysm is noted measuring 4.0 cm in AP dimension.  6.Severe age-indeterminate compression fracture deformity of the T12 vertebral level. There is no significant associated malalignment.        Electronically Signed: Robert Reed MD    2/27/2024 12:15 PM EST    Workstation ID: [id number]    XR Chest 1 View [[id number]] Collected: 02/27/24 1033     Updated: 02/27/24 1045    Narrative:      XR CHEST 1 VW    Date of Exam: 2/27/2024 10:16 AM EST    Indication: Short of air shortness of breath for 2 to 3 days. Worsening this morning. History of smoking.    Comparison: None available.    FINDINGS:  There are ill-defined multifocal airspace infiltrates throughout the lungs bilaterally with associated diffuse interstitial changes. No pleural effusions are observed. The cardiac silhouette and mediastinum are unremarkable. No acute osseous   abnormalities are identified.      Impression:      1.Ill-defined multifocal airspace infiltrates bilaterally with diffuse interstitial changes. The findings suggest developing atypical/viral infection or multifocal  pneumonia. Changes of COVID-19 infection may be included in the differential. Changes of   CHF and pulmonary edema may also be considered. Recommend correlation for signs or symptoms of acute infection and follow-up to ensure improvement/resolution.        Electronically Signed: Robert Reed MD    2/27/2024 10:35 AM EST    Workstation ID: [id number]          reviewed    EKG reviewed.  No prior tracing available to me.    Assessment & Plan     Acute CHF Exacerbation: Appreciate Dr. Good's review as no record's are available to us here and [facility]. his echo has been completed but the report is pending.  He is diuresed well with 80 mg of Lasix.  [facility].  Suspect this is likely going to be ischemic cardiomyopathy.  Diabetes mellitus type 2: Mr. Han reports he does check his blood sugar every day but cannot remember what his readings are.  I will start him on Glucomander.  [facility].  Alcohol abuse: I have started him on the protocol.  I am not sure if his tachycardia is due to withdrawal symptoms as his alcohol level is negative, or to beta-blocker withdraw as it would make sense with his coronary disease that he would be on a beta-blocker.  Coronary artery disease: By records procured by Dr. Good.  Reports no chest pain.  Await echo results.    I discussed the patient's findings and my recommendations with patient.     Anand Vieira MD  02/27/24  16:12 EST

## 2024-02-27 NOTE — ED NOTES
Nursing report ED to floor  Sheron Han  78 y.o.  male    HPI :   Chief Complaint   Patient presents with    Shortness of Breath     Sudden onset SOB, unprovoked since last night. Cough productive of thick opaque mucous. Denies CP. No N/V/F. States stopped smoking cigarettes 1 week ago. Cardiac stints placed 9 ms ago.        Admitting doctor:   Anand Vieira MD    Admitting diagnosis:   The encounter diagnosis was Congestive heart failure, unspecified HF chronicity, unspecified heart failure type.    Code status:   Current Code Status       Date Active Code Status Order ID Comments User Context       Not on file            Allergies:   Patient has no known allergies.    Isolation:   No active isolations    Intake and Output    Intake/Output Summary (Last 24 hours) at 2/27/2024 1330  Last data filed at 2/27/2024 1318  Gross per 24 hour   Intake --   Output 300 ml   Net -300 ml       Weight:       02/27/24  0937   Weight: 90.7 kg (200 lb)       Most recent vitals:   Vitals:    02/27/24 1107 02/27/24 1130 02/27/24 1300 02/27/24 1317   BP: 143/94 140/100 156/100 (!) 156/101   BP Location: Right arm Right arm Right arm Right arm   Patient Position: Sitting Sitting Sitting Sitting   Pulse: 120 120 (!) 126 (!) 122   Resp: 28 22 24 24   Temp:       TempSrc:       SpO2: 97% 94% 96% 96%   Weight:       Height:           Active LDAs/IV Access:   Lines, Drains & Airways       Active LDAs       Name Placement date Placement time Site Days    Peripheral IV 02/27/24 0941 Left Antecubital 02/27/24  0941  Antecubital  less than 1                    Labs (abnormal labs have a star):   Labs Reviewed   COMPREHENSIVE METABOLIC PANEL - Abnormal; Notable for the following components:       Result Value    Glucose 158 (*)     Creatinine 1.65 (*)     Anion Gap 15.9 (*)     eGFR 42.2 (*)     All other components within normal limits    Narrative:     GFR Normal >60  Chronic Kidney Disease <60  Kidney Failure <15    The GFR formula is  only valid for adults with stable renal function between ages 18 and 70.   BNP (IN-HOUSE) - Abnormal; Notable for the following components:    proBNP 13,365.0 (*)     All other components within normal limits    Narrative:     This assay is used as an aid in the diagnosis of individuals suspected of having heart failure. It can be used as an aid in the diagnosis of acute decompensated heart failure (ADHF) in patients presenting with signs and symptoms of ADHF to the emergency department (ED). In addition, NT-proBNP of <300 pg/mL indicates ADHF is not likely.    Age Range Result Interpretation  NT-proBNP Concentration (pg/mL:      <50             Positive            >450                   Gray                 300-450                    Negative             <300    50-75           Positive            >900                  Gray                300-900                  Negative            <300      >75             Positive            >1800                  Gray                300-1800                  Negative            <300   D-DIMER, QUANTITATIVE - Abnormal; Notable for the following components:    D-Dimer, Quantitative 1.86 (*)     All other components within normal limits    Narrative:     According to the assay 's published package insert, a normal (<0.50 MCGFEU/mL) D-dimer result in conjunction with a non-high clinical probability assessment, excludes deep vein thrombosis (DVT) and pulmonary embolism (PE) with high sensitivity.    D-dimer values increase with age and this can make VTE exclusion of an older population difficult. To address this, the [employer], based on best available evidence and recent guidelines, recommends that clinicians use age-adjusted D-dimer thresholds in patients greater than 50 years of age with: a) a low probability of PE who do not meet all Pulmonary Embolism Rule Out Criteria, or b) in those with intermediate probability of PE.   The formula for an  "age-adjusted D-dimer cut-off is \"age/100\".  For example, a 60 year old patient would have an age-adjusted cut-off of 0.60 MCGFEU/mL and an 80 year old 0.80 MCGFEU/mL.   TROPONIN - Abnormal; Notable for the following components:    HS Troponin T 65 (*)     All other components within normal limits    Narrative:     High Sensitive Troponin T Reference Range:  <14.0 ng/L- Negative Female for AMI  <22.0 ng/L- Negative Male for AMI  >=14 - Abnormal Female indicating possible myocardial injury.  >=22 - Abnormal Male indicating possible myocardial injury.   Clinicians would have to utilize clinical acumen, EKG, Troponin, and serial changes to determine if it is an Acute Myocardial Infarction or myocardial injury due to an underlying chronic condition.        CBC WITH AUTO DIFFERENTIAL - Abnormal; Notable for the following components:    WBC 13.28 (*)     RBC 6.80 (*)     Hematocrit 51.8 (*)     MCV 76.2 (*)     MCH 22.6 (*)     MCHC 29.7 (*)     RDW 20.6 (*)     Neutrophil % 84.3 (*)     Lymphocyte % 7.0 (*)     Immature Grans % 1.1 (*)     Neutrophils, Absolute 11.20 (*)     Immature Grans, Absolute 0.14 (*)     All other components within normal limits   HIGH SENSITIVITIY TROPONIN T 2HR - Abnormal; Notable for the following components:    HS Troponin T 54 (*)     Troponin T Delta -11 (*)     All other components within normal limits    Narrative:     High Sensitive Troponin T Reference Range:  <14.0 ng/L- Negative Female for AMI  <22.0 ng/L- Negative Male for AMI  >=14 - Abnormal Female indicating possible myocardial injury.  >=22 - Abnormal Male indicating possible myocardial injury.   Clinicians would have to utilize clinical acumen, EKG, Troponin, and serial changes to determine if it is an Acute Myocardial Infarction or myocardial injury due to an underlying chronic condition.        COVID-19 AND FLU A/B, NP SWAB IN TRANSPORT MEDIA 1 HR TAT - Normal    Narrative:     Fact sheet for providers: " "https://www.fda.gov/media/335572/download    Fact sheet for patients: https://www.fda.gov/media/875971/download    Test performed by PCR.   RAPID STREP A SCREEN - Normal   PROCALCITONIN - Normal    Narrative:     As a Marker for Sepsis (Non-Neonates):    1. <0.5 ng/mL represents a low risk of severe sepsis and/or septic shock.  2. >2 ng/mL represents a high risk of severe sepsis and/or septic shock.    As a Marker for Lower Respiratory Tract Infections that require antibiotic therapy:    PCT on Admission    Antibiotic Therapy       6-12 Hrs later    >0.5                Strongly Recommended  >0.25 - <0.5        Recommended   0.1 - 0.25          Discouraged              Remeasure/reassess PCT  <0.1                Strongly Discouraged     Remeasure/reassess PCT    As 28 day mortality risk marker: \"Change in Procalcitonin Result\" (>80% or <=80%) if Day 0 (or Day 1) and Day 4 values are available. Refer to http://www.[employer] [facility]ct-calculator.com    Change in PCT <=80%  A decrease of PCT levels below or equal to 80% defines a positive change in PCT test result representing a higher risk for 28-day all-cause mortality of patients diagnosed with severe sepsis for septic shock.    Change in PCT >80%  A decrease of PCT levels of more than 80% defines a negative change in PCT result representing a lower risk for 28-day all-cause mortality of patients diagnosed with severe sepsis or septic shock.      LACTIC ACID, PLASMA - Normal   BLOOD CULTURE   BLOOD CULTURE   RESPIRATORY PANEL PCR W/ COVID-19 (SARS-COV-2), NP SWAB IN UTM/VTP, 2 HR TAT   BETA HEMOLYTIC STREP CULTURE, THROAT   CBC AND DIFFERENTIAL    Narrative:     The following orders were created for panel order CBC & Differential.  Procedure                               Abnormality         Status                     ---------                               -----------         ------                     CBC Auto Differential[[id number]]        Abnormal            Final result   "               Please view results for these tests on the individual orders.       Results       Procedure Component Value Ref Range Date/Time    High Sensitivity Troponin T 2Hr [[id number]]  (Abnormal) Collected: 02/27/24 1136    Order Status: Completed Specimen: Blood from Arm, Right Updated: 02/27/24 1207     HS Troponin T 54 <22 ng/L      Troponin T Delta -11 >=-4 - <+4 ng/L     Narrative:      High Sensitive Troponin T Reference Range:  <14.0 ng/L- Negative Female for AMI  <22.0 ng/L- Negative Male for AMI  >=14 - Abnormal Female indicating possible myocardial injury.  >=22 - Abnormal Male indicating possible myocardial injury.   Clinicians would have to utilize clinical acumen, EKG, Troponin, and serial changes to determine if it is an Acute Myocardial Infarction or myocardial injury due to an underlying chronic condition.         Lactic Acid, Plasma [[id number]]  (Normal) Collected: 02/27/24 1056    Order Status: Completed Specimen: Blood from Arm, Left Updated: 02/27/24 1147     Lactate 1.9 0.5 - 2.0 mmol/L     Blood Culture - Blood, Arm, Left [[id number]] Collected: 02/27/24 1056    Order Status: Sent Specimen: Blood from Arm, Left Updated: 02/27/24 1136    Blood Culture - Blood, Arm, Right [[id number]] Collected: 02/27/24 1106    Order Status: Sent Specimen: Blood from Arm, Right Updated: 02/27/24 1136    Procalcitonin [[id number]]  (Normal) Collected: 02/27/24 0942    Order Status: Completed Specimen: Blood Updated: 02/27/24 1118     Procalcitonin 0.05 0.00 - 0.25 ng/mL     Narrative:      As a Marker for Sepsis (Non-Neonates):    1. <0.5 ng/mL represents a low risk of severe sepsis and/or septic shock.  2. >2 ng/mL represents a high risk of severe sepsis and/or septic shock.    As a Marker for Lower Respiratory Tract Infections that require antibiotic therapy:    PCT on Admission    Antibiotic Therapy       6-12 Hrs later    >0.5                Strongly Recommended  >0.25 - <0.5        Recommended   0.1 - 0.25  "         Discouraged              Remeasure/reassess PCT  <0.1                Strongly Discouraged     Remeasure/reassess PCT    As 28 day mortality risk marker: \"Change in Procalcitonin Result\" (>80% or <=80%) if Day 0 (or Day 1) and Day 4 values are available. Refer to http://www.[facility]-pct-calculator.com    Change in PCT <=80%  A decrease of PCT levels below or equal to 80% defines a positive change in PCT test result representing a higher risk for 28-day all-cause mortality of patients diagnosed with severe sepsis for septic shock.    Change in PCT >80%  A decrease of PCT levels of more than 80% defines a negative change in PCT result representing a lower risk for 28-day all-cause mortality of patients diagnosed with severe sepsis or septic shock.       BNP [[id number]]  (Abnormal) Collected: 02/27/24 0942    Order Status: Completed Specimen: Blood Updated: 02/27/24 1106     proBNP 13,365.0 0.0 - 1,800.0 pg/mL     Narrative:      This assay is used as an aid in the diagnosis of individuals suspected of having heart failure. It can be used as an aid in the diagnosis of acute decompensated heart failure (ADHF) in patients presenting with signs and symptoms of ADHF to the emergency department (ED). In addition, NT-proBNP of <300 pg/mL indicates ADHF is not likely.    Age Range Result Interpretation  NT-proBNP Concentration (pg/mL:      <50             Positive            >450                   Gray                 300-450                    Negative             <300    50-75           Positive            >900                  Gray                300-900                  Negative            <300      >75             Positive            >1800                  Gray                300-1800                  Negative            <300    Respiratory Panel PCR w/COVID-19(SARS-CoV-2) BLANCO/JUAN/NAHEED/PAD/COR/CHRISTINE In-House, NP Swab in UTM/VTM, 2 HR TAT - Swab, Nasopharynx [[id number]] Collected: 02/27/24 1050    Order Status: Sent " Specimen: Swab from Nasopharynx Updated: 02/27/24 1050    Rapid Strep A Screen - Swab, Throat [[id number]]  (Normal) Collected: 02/27/24 1001    Order Status: Completed Specimen: Swab from Throat Updated: 02/27/24 1047     Strep A Ag Negative Negative     Beta Strep Culture, Throat - Swab, Throat [[id number]] Collected: 02/27/24 1001    Order Status: Sent Specimen: Swab from Throat Updated: 02/27/24 1047    High Sensitivity Troponin T [[id number]]  (Abnormal) Collected: 02/27/24 0942    Order Status: Completed Specimen: Blood Updated: 02/27/24 1032     HS Troponin T 65 <22 ng/L     Narrative:      High Sensitive Troponin T Reference Range:  <14.0 ng/L- Negative Female for AMI  <22.0 ng/L- Negative Male for AMI  >=14 - Abnormal Female indicating possible myocardial injury.  >=22 - Abnormal Male indicating possible myocardial injury.   Clinicians would have to utilize clinical acumen, EKG, Troponin, and serial changes to determine if it is an Acute Myocardial Infarction or myocardial injury due to an underlying chronic condition.         CBC Auto Differential [[id number]]  (Abnormal) Collected: 02/27/24 0942    Order Status: Completed Specimen: Blood Updated: 02/27/24 1031     WBC 13.28 3.40 - 10.80 10*3/mm3      RBC 6.80 4.14 - 5.80 10*6/mm3      Hemoglobin 15.4 13.0 - 17.7 g/dL      Hematocrit 51.8 37.5 - 51.0 %      MCV 76.2 79.0 - 97.0 fL      MCH 22.6 26.6 - 33.0 pg      MCHC 29.7 31.5 - 35.7 g/dL      RDW 20.6 12.3 - 15.4 %      RDW-SD 50.5 37.0 - 54.0 fl      MPV 11.3 6.0 - 12.0 fL      Platelets 383 140 - 450 10*3/mm3      Neutrophil % 84.3 42.7 - 76.0 %      Lymphocyte % 7.0 19.6 - 45.3 %      Monocyte % 6.0 5.0 - 12.0 %      Eosinophil % 0.7 0.3 - 6.2 %      Basophil % 0.9 0.0 - 1.5 %      Immature Grans % 1.1 0.0 - 0.5 %      Neutrophils, Absolute 11.20 1.70 - 7.00 10*3/mm3      Lymphocytes, Absolute 0.93 0.70 - 3.10 10*3/mm3      Monocytes, Absolute 0.80 0.10 - 0.90 10*3/mm3      Eosinophils, Absolute 0.09  0.00 - 0.40 10*3/mm3      Basophils, Absolute 0.12 0.00 - 0.20 10*3/mm3      Immature Grans, Absolute 0.14 0.00 - 0.05 10*3/mm3      nRBC 0.0 0.0 - 0.2 /100 WBC     COVID-19 and FLU A/B PCR, 1 HR TAT - Swab, Nasopharynx [[id number]]  (Normal) Collected: 02/27/24 0942    Order Status: Completed Specimen: Swab from Nasopharynx Updated: 02/27/24 1017     COVID19 Not Detected Not Detected - Ref. Range      Influenza A PCR Not Detected Not Detected      Influenza B PCR Not Detected Not Detected     Narrative:      Fact sheet for providers: https://www.fda.gov/media/607760/download    Fact sheet for patients: https://www.fda.gov/media/464909/download    Test performed by PCR.    Comprehensive Metabolic Panel [[id number]]  (Abnormal) Collected: 02/27/24 0942    Order Status: Completed Specimen: Blood Updated: 02/27/24 1015     Glucose 158 65 - 99 mg/dL      BUN 21 8 - 23 mg/dL      Creatinine 1.65 0.76 - 1.27 mg/dL      Sodium 139 136 - 145 mmol/L      Potassium 3.8 3.5 - 5.2 mmol/L      Chloride 100 98 - 107 mmol/L      CO2 23.1 22.0 - 29.0 mmol/L      Calcium 9.4 8.6 - 10.5 mg/dL      Total Protein 7.2 6.0 - 8.5 g/dL      Albumin 4.4 3.5 - 5.2 g/dL      ALT (SGPT) 13 1 - 41 U/L      AST (SGOT) 14 1 - 40 U/L      Alkaline Phosphatase 90 39 - 117 U/L      Total Bilirubin 0.6 0.0 - 1.2 mg/dL      Globulin 2.8 gm/dL      A/G Ratio 1.6 g/dL      BUN/Creatinine Ratio 12.7 7.0 - 25.0      Anion Gap 15.9 5.0 - 15.0 mmol/L      eGFR 42.2 >60.0 mL/min/1.73     Narrative:      GFR Normal >60  Chronic Kidney Disease <60  Kidney Failure <15    The GFR formula is only valid for adults with stable renal function between ages 18 and 70.    D-dimer, Quantitative [[id number]]  (Abnormal) Collected: 02/27/24 0942    Order Status: Completed Specimen: Blood Updated: 02/27/24 1013     D-Dimer, Quantitative 1.86 0.00 - 0.78 MCGFEU/mL     Narrative:      According to the assay 's published package insert, a normal (<0.50 MCGFEU/mL)  "D-dimer result in conjunction with a non-high clinical probability assessment, excludes deep vein thrombosis (DVT) and pulmonary embolism (PE) with high sensitivity.    D-dimer values increase with age and this can make VTE exclusion of an older population difficult. To address this, the [employer], based on best available evidence and recent guidelines, recommends that clinicians use age-adjusted D-dimer thresholds in patients greater than 50 years of age with: a) a low probability of PE who do not meet all Pulmonary Embolism Rule Out Criteria, or b) in those with intermediate probability of PE.   The formula for an age-adjusted D-dimer cut-off is \"age/100\".  For example, a 60 year old patient would have an age-adjusted cut-off of 0.60 MCGFEU/mL and an 80 year old 0.80 MCGFEU/mL.             EKG:   ECG 12 Lead Dyspnea   Preliminary Result   HEART RATE= 122  bpm   RR Interval= 492  ms   ID Interval= 142  ms   P Horizontal Axis= -23  deg   P Front Axis= 28  deg   QRSD Interval= 91  ms   QT Interval= 324  ms   QTcB= 462  ms   QRS Axis= 16  deg   T Wave Axis= 169  deg   - ABNORMAL ECG -   Sinus tachycardia   Anteroseptal infarct, old   Abnormal T, consider ischemia, lateral leads   Electronically Signed By:    Date and Time of Study: 2024-02-27 09:42:04          Meds given in ED:   Medications   ipratropium-albuterol (DUO-NEB) nebulizer solution 3 mL (3 mL Nebulization Given 2/27/24 1009)   methylPREDNISolone sodium succinate (SOLU-Medrol) injection 125 mg (125 mg Intravenous Given 2/27/24 1023)   iopamidol (ISOVUE-370) 76 % injection 100 mL (100 mL Intravenous Given 2/27/24 1147)   furosemide (LASIX) injection 80 mg (80 mg Intravenous Given 2/27/24 1237)       Imaging results:  CT Angiogram Chest    Result Date: 2/27/2024  1.No evidence for pulmonary embolism. 2.Extensive multifocal diffuse airspace infiltrates and diffuse interstitial changes throughout the lungs bilaterally. Associated pleural " effusions are observed. The findings suggest changes of potential developing atypical/viral infection or multifocal pneumonia. Changes of CHF and pulmonary edema may also contribute to these findings. Recommend correlation for signs or symptoms of acute infection and follow-up to ensure improvement/resolution. 3.Bilateral hilar and mediastinal lymphadenopathy likely reactive in nature. Recommend follow-up to ensure resolution. 4.Severe coronary artery calcifications are noted. 5.An ascending thoracic aortic aneurysm is noted measuring 4.0 cm in AP dimension. 6.Severe age-indeterminate compression fracture deformity of the T12 vertebral level. There is no significant associated malalignment. Electronically Signed: Robert Reed MD  2024 12:15 PM EST  Workstation ID: [id number]    XR Chest 1 View    Result Date: 2024  1.Ill-defined multifocal airspace infiltrates bilaterally with diffuse interstitial changes. The findings suggest developing atypical/viral infection or multifocal pneumonia. Changes of COVID-19 infection may be included in the differential. Changes of CHF and pulmonary edema may also be considered. Recommend correlation for signs or symptoms of acute infection and follow-up to ensure improvement/resolution. Electronically Signed: Robert Reed MD  2024 10:35 AM EST  Workstation ID: [id number]     Ambulatory status:   - ambulatory    Social issues:   Social History     Socioeconomic History    Marital status:    Tobacco Use    Smoking status: Former     Packs/day: 1.00     Years: 25.00     Additional pack years: 0.00     Total pack years: 25.00     Types: Cigarettes     Quit date: 2024     Years since quittin.0    Smokeless tobacco: Never   Vaping Use    Vaping Use: Never used   Substance and Sexual Activity    Alcohol use: Not Currently    Drug use: Defer    Sexual activity: Defer       NIH Stroke Scale:         MARILEE Reyez RN  24 13:30 EST

## 2024-02-28 LAB
ALBUMIN SERPL-MCNC: 4.2 G/DL (ref 3.5–5.2)
ALBUMIN/GLOB SERPL: 1.6 G/DL
ALP SERPL-CCNC: 89 U/L (ref 39–117)
ALT SERPL W P-5'-P-CCNC: 14 U/L (ref 1–41)
ANION GAP SERPL CALCULATED.3IONS-SCNC: 17.2 MMOL/L (ref 5–15)
AST SERPL-CCNC: 15 U/L (ref 1–40)
BASOPHILS # BLD AUTO: 0.05 10*3/MM3 (ref 0–0.2)
BASOPHILS NFR BLD AUTO: 0.4 % (ref 0–1.5)
BILIRUB SERPL-MCNC: 0.4 MG/DL (ref 0–1.2)
BUN SERPL-MCNC: 30 MG/DL (ref 8–23)
BUN/CREAT SERPL: 16 (ref 7–25)
CALCIUM SPEC-SCNC: 9.5 MG/DL (ref 8.6–10.5)
CHLORIDE SERPL-SCNC: 101 MMOL/L (ref 98–107)
CO2 SERPL-SCNC: 20.8 MMOL/L (ref 22–29)
CREAT SERPL-MCNC: 1.88 MG/DL (ref 0.76–1.27)
DEPRECATED RDW RBC AUTO: 47.8 FL (ref 37–54)
EGFRCR SERPLBLD CKD-EPI 2021: 36.1 ML/MIN/1.73
EOSINOPHIL # BLD AUTO: 0.01 10*3/MM3 (ref 0–0.4)
EOSINOPHIL NFR BLD AUTO: 0.1 % (ref 0.3–6.2)
ERYTHROCYTE [DISTWIDTH] IN BLOOD BY AUTOMATED COUNT: 20.6 % (ref 12.3–15.4)
GLOBULIN UR ELPH-MCNC: 2.7 GM/DL
GLUCOSE BLDC GLUCOMTR-MCNC: 139 MG/DL (ref 70–130)
GLUCOSE BLDC GLUCOMTR-MCNC: 154 MG/DL (ref 70–130)
GLUCOSE BLDC GLUCOMTR-MCNC: 172 MG/DL (ref 70–130)
GLUCOSE BLDC GLUCOMTR-MCNC: 91 MG/DL (ref 70–130)
GLUCOSE SERPL-MCNC: 213 MG/DL (ref 65–99)
HCT VFR BLD AUTO: 49.5 % (ref 37.5–51)
HGB BLD-MCNC: 15.4 G/DL (ref 13–17.7)
IMM GRANULOCYTES # BLD AUTO: 0.18 10*3/MM3 (ref 0–0.05)
IMM GRANULOCYTES NFR BLD AUTO: 1.3 % (ref 0–0.5)
LYMPHOCYTES # BLD AUTO: 0.81 10*3/MM3 (ref 0.7–3.1)
LYMPHOCYTES NFR BLD AUTO: 5.7 % (ref 19.6–45.3)
MAGNESIUM SERPL-MCNC: 2.4 MG/DL (ref 1.6–2.4)
MCH RBC QN AUTO: 22.8 PG (ref 26.6–33)
MCHC RBC AUTO-ENTMCNC: 31.1 G/DL (ref 31.5–35.7)
MCV RBC AUTO: 73.2 FL (ref 79–97)
MONOCYTES # BLD AUTO: 0.96 10*3/MM3 (ref 0.1–0.9)
MONOCYTES NFR BLD AUTO: 6.8 % (ref 5–12)
NEUTROPHILS NFR BLD AUTO: 12.14 10*3/MM3 (ref 1.7–7)
NEUTROPHILS NFR BLD AUTO: 85.7 % (ref 42.7–76)
NRBC BLD AUTO-RTO: 0 /100 WBC (ref 0–0.2)
PLATELET # BLD AUTO: 446 10*3/MM3 (ref 140–450)
PMV BLD AUTO: 11.6 FL (ref 6–12)
POTASSIUM SERPL-SCNC: 4 MMOL/L (ref 3.5–5.2)
PROCALCITONIN SERPL-MCNC: 0.07 NG/ML (ref 0–0.25)
PROT SERPL-MCNC: 6.9 G/DL (ref 6–8.5)
RBC # BLD AUTO: 6.76 10*6/MM3 (ref 4.14–5.8)
SODIUM SERPL-SCNC: 139 MMOL/L (ref 136–145)
WBC NRBC COR # BLD AUTO: 14.15 10*3/MM3 (ref 3.4–10.8)

## 2024-02-28 PROCEDURE — 63710000001 INSULIN ASPART PER 5 UNITS: Performed by: HOSPITALIST

## 2024-02-28 PROCEDURE — 94799 UNLISTED PULMONARY SVC/PX: CPT

## 2024-02-28 PROCEDURE — 83735 ASSAY OF MAGNESIUM: CPT | Performed by: HOSPITALIST

## 2024-02-28 PROCEDURE — 84145 PROCALCITONIN (PCT): CPT | Performed by: HOSPITALIST

## 2024-02-28 PROCEDURE — G0378 HOSPITAL OBSERVATION PER HR: HCPCS

## 2024-02-28 PROCEDURE — 63710000001 INSULIN DETEMIR PER 5 UNITS: Performed by: HOSPITALIST

## 2024-02-28 PROCEDURE — 82948 REAGENT STRIP/BLOOD GLUCOSE: CPT

## 2024-02-28 PROCEDURE — 85025 COMPLETE CBC W/AUTO DIFF WBC: CPT | Performed by: HOSPITALIST

## 2024-02-28 PROCEDURE — 99232 SBSQ HOSP IP/OBS MODERATE 35: CPT | Performed by: HOSPITALIST

## 2024-02-28 PROCEDURE — 25010000002 FUROSEMIDE PER 20 MG: Performed by: PHYSICIAN ASSISTANT

## 2024-02-28 PROCEDURE — 80053 COMPREHEN METABOLIC PANEL: CPT | Performed by: HOSPITALIST

## 2024-02-28 PROCEDURE — 99214 OFFICE O/P EST MOD 30 MIN: CPT | Performed by: PHYSICIAN ASSISTANT

## 2024-02-28 PROCEDURE — 25010000002 THIAMINE HCL 200 MG/2ML SOLUTION: Performed by: HOSPITALIST

## 2024-02-28 RX ORDER — METOPROLOL SUCCINATE 25 MG/1
25 TABLET, EXTENDED RELEASE ORAL
Status: DISCONTINUED | OUTPATIENT
Start: 2024-02-28 | End: 2024-03-01 | Stop reason: HOSPADM

## 2024-02-28 RX ORDER — FUROSEMIDE 10 MG/ML
40 INJECTION INTRAMUSCULAR; INTRAVENOUS ONCE
Status: COMPLETED | OUTPATIENT
Start: 2024-02-28 | End: 2024-02-28

## 2024-02-28 RX ADMIN — HYDROCODONE BITARTRATE AND ACETAMINOPHEN 1 TABLET: 10; 325 TABLET ORAL at 08:49

## 2024-02-28 RX ADMIN — LEVETIRACETAM 500 MG: 500 TABLET, FILM COATED ORAL at 21:02

## 2024-02-28 RX ADMIN — INSULIN ASPART 8 UNITS: 100 INJECTION, SOLUTION INTRAVENOUS; SUBCUTANEOUS at 08:53

## 2024-02-28 RX ADMIN — FOLIC ACID 1 MG: 1 TABLET ORAL at 08:49

## 2024-02-28 RX ADMIN — LISINOPRIL 20 MG: 20 TABLET ORAL at 08:49

## 2024-02-28 RX ADMIN — EMPAGLIFLOZIN 10 MG: 10 TABLET, FILM COATED ORAL at 11:32

## 2024-02-28 RX ADMIN — GLIPIZIDE 5 MG: 5 TABLET ORAL at 08:53

## 2024-02-28 RX ADMIN — INSULIN ASPART 2 UNITS: 100 INJECTION, SOLUTION INTRAVENOUS; SUBCUTANEOUS at 13:03

## 2024-02-28 RX ADMIN — ALLOPURINOL 200 MG: 100 TABLET ORAL at 08:49

## 2024-02-28 RX ADMIN — RIVAROXABAN 10 MG: 10 TABLET, FILM COATED ORAL at 17:24

## 2024-02-28 RX ADMIN — LORAZEPAM 2 MG: 1 TABLET ORAL at 11:32

## 2024-02-28 RX ADMIN — PANTOPRAZOLE SODIUM 40 MG: 40 TABLET, DELAYED RELEASE ORAL at 05:52

## 2024-02-28 RX ADMIN — ATORVASTATIN CALCIUM 80 MG: 40 TABLET, FILM COATED ORAL at 21:02

## 2024-02-28 RX ADMIN — LEVETIRACETAM 500 MG: 500 TABLET, FILM COATED ORAL at 08:49

## 2024-02-28 RX ADMIN — GABAPENTIN 300 MG: 300 CAPSULE ORAL at 05:52

## 2024-02-28 RX ADMIN — LORAZEPAM 2 MG: 1 TABLET ORAL at 04:39

## 2024-02-28 RX ADMIN — INSULIN ASPART 3 UNITS: 100 INJECTION, SOLUTION INTRAVENOUS; SUBCUTANEOUS at 22:09

## 2024-02-28 RX ADMIN — GABAPENTIN 300 MG: 300 CAPSULE ORAL at 13:04

## 2024-02-28 RX ADMIN — THIAMINE HYDROCHLORIDE 200 MG: 100 INJECTION, SOLUTION INTRAMUSCULAR; INTRAVENOUS at 13:04

## 2024-02-28 RX ADMIN — LORAZEPAM 1 MG: 1 TABLET ORAL at 21:05

## 2024-02-28 RX ADMIN — FLUOXETINE HYDROCHLORIDE 60 MG: 20 CAPSULE ORAL at 08:49

## 2024-02-28 RX ADMIN — INSULIN DETEMIR 23 UNITS: 100 INJECTION, SOLUTION SUBCUTANEOUS at 22:10

## 2024-02-28 RX ADMIN — METOPROLOL SUCCINATE 25 MG: 25 TABLET, EXTENDED RELEASE ORAL at 11:32

## 2024-02-28 RX ADMIN — FUROSEMIDE 40 MG: 10 INJECTION, SOLUTION INTRAMUSCULAR; INTRAVENOUS at 11:32

## 2024-02-28 RX ADMIN — LEVOTHYROXINE SODIUM 75 MCG: 75 TABLET ORAL at 05:52

## 2024-02-28 RX ADMIN — THIAMINE HYDROCHLORIDE 200 MG: 100 INJECTION, SOLUTION INTRAMUSCULAR; INTRAVENOUS at 21:02

## 2024-02-28 RX ADMIN — CLOPIDOGREL BISULFATE 75 MG: 75 TABLET ORAL at 08:49

## 2024-02-28 RX ADMIN — GABAPENTIN 300 MG: 300 CAPSULE ORAL at 21:02

## 2024-02-28 RX ADMIN — THIAMINE HYDROCHLORIDE 200 MG: 100 INJECTION, SOLUTION INTRAMUSCULAR; INTRAVENOUS at 05:52

## 2024-02-28 RX ADMIN — INSULIN ASPART 4 UNITS: 100 INJECTION, SOLUTION INTRAVENOUS; SUBCUTANEOUS at 17:24

## 2024-02-28 NOTE — CASE MANAGEMENT/SOCIAL WORK
Discharge Planning Assessment  CRYSTAL Mac     Patient Name: Sheron Han  MRN: [id number]  Today's Date: 2/28/2024    Admit Date: 2/27/2024    Plan: plan home with granddaughter   Discharge Needs Assessment       Row Name 02/28/24 1452       Living Environment    People in Home grandparent(s)    Name(s) of People in Home Guille Bom - granddtr    Current Living Arrangements home    Potentially Unsafe Housing Conditions none    In the past 12 months has the electric, gas, oil, or water company threatened to shut off services in your home? No    Primary Care Provided by self    Provides Primary Care For grandchild(esteban)    Caregiving Concerns 18yo granddtr lives with patient    Family Caregiver if Needed grandchild(esteban), adult    Quality of Family Relationships supportive    Able to Return to Prior Arrangements yes       Resource/Environmental Concerns    Resource/Environmental Concerns none    Transportation Concerns none       Transportation Needs    In the past 12 months, has lack of transportation kept you from medical appointments or from getting medications? no    In the past 12 months, has lack of transportation kept you from meetings, work, or from getting things needed for daily living? No       Transition Planning    Patient/Family Anticipates Transition to home with family    Patient/Family Anticipated Services at Transition none    Transportation Anticipated family or friend will provide       Discharge Needs Assessment    Readmission Within the Last 30 Days no previous admission in last 30 days    Equipment Currently Used at Home none    Concerns to be Addressed denies needs/concerns at this time    Anticipated Changes Related to Illness none    Equipment Needed After Discharge none    Provided Post Acute Provider List? N/A    Provided Post Acute Provider Quality & Resource List? N/A                   Discharge Plan       Row Name 02/28/24 1455       Plan    Plan plan home with granddaughter     Patient/Family in Agreement with Plan yes    Provided Post Acute Provider List? N/A    Provided Post Acute Provider Quality & Resource List? N/A    Plan Comments Spoke with patient at bedside. Face sheet verified. Patient lives in a home with his 18yo granddaughter. He is independent of ADLs including driving. He denies use of DME/HH/Rehab. He states he does have a couple fo canes that had belonged to his wife, but he does not use them.  He does not have a living will. [facility]. There are no issues obtaining medications. There are no concerns r/t food/housing/utilities or transportation. He plans to return home with his granddaughter to assist as needed at [place]. CM # placed on white board, will continue to follow.                  Continued Care and Services - Admitted Since 2/27/2024    Coordination has not been started for this encounter.       Expected Discharge Date and Time       Expected Discharge Date Expected Discharge Time    Feb 29, 2024            Demographic Summary    No documentation.                  Functional Status    No documentation.                  Psychosocial    No documentation.                  Abuse/Neglect    No documentation.                  Legal    No documentation.                  Substance Abuse    No documentation.                  Patient Forms    No documentation.                     Johnson Selby, RN

## 2024-02-28 NOTE — PLAN OF CARE
Goal Outcome Evaluation:  Plan of Care Reviewed With: patient        Progress: improving  Outcome Evaluation: pt is much better today, states he feels wonderful now. has been up ad paulo, ambulating to BR, frequent UOP with lasix. on room air, no soa or distress noted. VSS. HR now 100-105, has had metoprolol , ativan this shift, tolerating well.

## 2024-02-28 NOTE — PROGRESS NOTES
Patient Name: Sheron Han  Age/Sex: 78 y.o. male  : 1945  MRN: [id number]    Date of Admission: 2024  Date of Encounter Visit: 24  Encounter Provider: Barrera Stout PA-C  Place of Service: [facility] CARDIOLOGY      Subjective:       Chief Complaint: Shortness of breath      History of Present Illness:  Sheron Han is a 78 y.o. male who presented to the ER on 2024 with complaint of worsening dyspnea on exertion and symptoms of orthopnea for x 3 days.  His BNP was noted to be elevated and he was started on IV Lasix for diuresis.  D-dimer was elevated and CTA was completed that was negative for PE.  [facility] and we have reached out to get these records.    He notes that in 2022 he had a heart catheterization and received a stent.    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  Currently on room air.  2 L out overnight.  He was initiated on CIWA protocol as she noted to have 6-7 drinks daily. Breathing is improved today. He is sitting up in chair. Denies chest pain, nausea, vomiting or dizziness.      Previous testing:   Echo 10/25/2021: EF 52% with normal LV size and function.  Trace to mild AR    Heart Cath 2022: History of inferior STEMI s/p ALBERTINA x 1 to the LAD for 80% lesion.  At that time EF was 45% with inferior hypokinesis    Home Medications:   Medications Prior to Admission   Medication Sig Dispense Refill Last Dose    HYDROcodone-acetaminophen (NORCO)  MG per tablet Take 1 tablet by mouth Every 6 (Six) Hours As Needed for Moderate Pain.   2024    allopurinol (ZYLOPRIM) 100 MG tablet Take 1 tablet by mouth Daily.       atorvastatin (LIPITOR) 80 MG tablet Take 1 tablet by mouth  Daily.       clopidogrel (PLAVIX) 75 MG tablet Take 1 tablet by mouth Daily.       colchicine 0.6 MG tablet Take 1 tablet by mouth Daily.       Continuous Blood Gluc Sensor (FreeStyle Kris 2 Sensor) misc USE AS DIRECTED ONE SENSOR EVERY 14 DAYS       FLUoxetine (PROzac) 10 MG capsule Take 3 capsules by mouth Daily.       gabapentin (NEURONTIN) 300 MG capsule Take 1 capsule by mouth 3 (Three) Times a Day.       glipizide (GLUCOTROL) 5 MG tablet Take 1 tablet by mouth Daily.       levETIRAcetam (KEPPRA) 500 MG tablet Take 1 tablet by mouth 2 (Two) Times a Day.       levothyroxine (SYNTHROID, LEVOTHROID) 75 MCG tablet Take 1 tablet by mouth Daily.       lisinopril (PRINIVIL,ZESTRIL) 20 MG tablet Take 1 tablet by mouth Daily.       omeprazole (priLOSEC) 20 MG capsule Take 1 capsule by mouth Daily.       rivaroxaban (XARELTO) 10 MG tablet Take 1 tablet by mouth Daily With Dinner.          Allergies:  No Known Allergies      Review of Systems:  All systems reviewed. Pertinent positives identified in HPI. All other systems are negative.       Objective:     Objective:  Temp:  [97.6 °F (36.4 °C)-97.9 °F (36.6 °C)] 97.8 °F (36.6 °C)  Heart Rate:  [112-134] 114  Resp:  [20-36] 21  BP: (114-156)/() 114/78    Intake/Output Summary (Last 24 hours) at 2/28/2024 0915  Last data filed at 2/28/2024 0604  Gross per 24 hour   Intake 600 ml   Output 2075 ml   Net -1475 ml     Body mass index is 27.89 kg/m².      02/27/24  0937 02/27/24  1452   Weight: 90.7 kg (200 lb) 90.7 kg (200 lb)         Physical Exam:   General: 78 y.o. male No acute distress,sitting up in bed.  Alert and Oriented.   Neck: No JVD or carotid bruit  Lungs: quite in the bilateral lung bases, rales in bilateral lung bases  Heart: Tachycardic rate with normal rhythm with no overt murmurs, rubs or gallops. Normal S1 and S2.   Abdomen: soft, non-tender  Extremities: No lower extremity edema or cyanosis.   Neuo: no lateralizing defects.     Lab Review:     Results  from last 7 days   Lab Units 24  0447 24  0942   SODIUM mmol/L 139 139   POTASSIUM mmol/L 4.0 3.8   CHLORIDE mmol/L 101 100   CO2 mmol/L 20.8* 23.1   BUN mg/dL 30* 21   CREATININE mg/dL 1.88* 1.65*   GLUCOSE mg/dL 213* 158*   CALCIUM mg/dL 9.5 9.4       Results from last 7 days   Lab Units 24  1136 24  0942   HSTROP T ng/L 54* 65*     Results from last 7 days   Lab Units 24  0447   WBC 10*3/mm3 14.15*   HEMOGLOBIN g/dL 15.4   HEMATOCRIT % 49.5   PLATELETS 10*3/mm3 446         Results from last 7 days   Lab Units 24  1136   CHOLESTEROL mg/dL 241*     Results from last 7 days   Lab Units 24  1136   MAGNESIUM mg/dL 1.1*     Results from last 7 days   Lab Units 24  1136   CHOLESTEROL mg/dL 241*   TRIGLYCERIDES mg/dL 154*   HDL CHOL mg/dL 42   LDL CHOL mg/dL 171*     Results from last 7 days   Lab Units 24  0942   PROBNP pg/mL 13,365.0*         Results from last 7 days   Lab Units 24  1136   TSH uIU/mL 1.770       Imaging:    chest X-ray    Results for orders placed during the hospital encounter of 24    Adult Transthoracic Echo Complete W/ Cont if Necessary Per Protocol    Interpretation Summary    Left ventricular systolic function is severely decreased. Calculated left ventricular EF = 35%; visually estimated LVEF 20-25%.  There is global hypokinesis with regional variations.    The left ventricular cavity is moderately dilated.    RV normal in size and function.    No significant valvular abnormalities.    Normal biatrial and IVC size.      Telemetry/EK24: Sinus tachycardia    I personally viewed and interpreted the patient's EKG/Telemetry data.    Assessment/ Plan:       1.  Acute on chronic heart failure with reduced ejection fraction-echo was completed showing an EF of 35% with global hypokinesis.  Newly diagnosed cardiomyopathy- could be due to etoh abuse vs ischemia. Plan to diuresis then do OP MPI. BNP was over 13,000.  4 L out  overnight. Start SGLT2. ARNi/ARB held due to KAREN. Start low dose BB. Continue with IV diuresis with IV lasix 40 today as tolerated. Replace potassium PRN.     2.  Sinus tachycardia- noted this AM with HR into the 110-20s. Could be physiologic in setting of eoth withdrawal vs. Compensatory for cardiomyopathy. Will start low dose toprol and monitor.     3.  Coronary artery disease with history of MI in 2022 s/p ALBERTINA x 1 to the LAD.  Troponin mildly elevated (65,54).  EKG with no acute ST segment changes.  Troponin elevation likely due to severity of cardiomyopathy.  Continue medical therapy in the form of Plavix, atorvastatin.  Start BB.     4.  Essential hypertension- Blood pressure controlled overnight. Monitor with starting low dose BB.     5.  Alcohol abuse on CIWA protocol- not ready to quit    6. KAREN- Crt today 1.88, will hold  off on ACE or ARB/ ARNI. Monitor diuresis closely     Thank you for allowing me to participate in the care of Sheron Han. Feel free to contact me directly with any further questions or concerns.    Barrera Stout PA-C  [facility]  02/28/24  09:15 EST

## 2024-02-28 NOTE — PROGRESS NOTES
"Hospitalist Team      Patient Care Team:  Provider, No Known as PCP - General        Chief Complaint:  Follow-up EtOH Withdrawal; CHF    Subjective    No acute events overnight.  Mr. Han is quite drowsy this morning.  HR much better this morning although did have some high trends overnight.  He has not had any O2 requirement.  Tolerating PO.    Objective    Vital Signs  Temp:  [97.6 °F (36.4 °C)-97.9 °F (36.6 °C)] 97.8 °F (36.6 °C)  Heart Rate:  [112-134] 114  Resp:  [20-36] 21  BP: (114-156)/() 114/78  Oxygen Therapy  SpO2: 92 %  Pulse Oximetry Type: Intermittent  Device (Oxygen Therapy): room air}    Flowsheet Rows      Flowsheet Row First Filed Value   Admission Height 180.3 cm (71\") Documented at 02/27/2024 0937   Admission Weight 90.7 kg (200 lb) Documented at 02/27/2024 0937              Physical Exam:    General: Appears in no acute distress.  Lungs: Breath sounds are clear.  Respirations are non-labored.  CV: Irregular rate and rhythm.  No murmurs appreciated.  Radial pulses are 2+ and symmetric.  Abdomen: Soft and non-tender w/ active bowel sounds.  MSK: No C/C/E.  Neuro: CN II-XII grossly intact.  Psych: Drowsy, but arousable and appropriate.    Results Review:     I reviewed the patient's new clinical results.    Lab Results (last 24 hours)       Procedure Component Value Units Date/Time    POC Glucose Once [[id number]]  (Abnormal) Collected: 02/28/24 0843    Specimen: Blood Updated: 02/28/24 0850     Glucose 172 mg/dL     Comprehensive Metabolic Panel [[id number]]  (Abnormal) Collected: 02/28/24 0447    Specimen: Blood Updated: 02/28/24 0611     Glucose 213 mg/dL      BUN 30 mg/dL      Creatinine 1.88 mg/dL      Sodium 139 mmol/L      Potassium 4.0 mmol/L      Chloride 101 mmol/L      CO2 20.8 mmol/L      Calcium 9.5 mg/dL      Total Protein 6.9 g/dL      Albumin 4.2 g/dL      ALT (SGPT) 14 U/L      AST (SGOT) 15 U/L      Alkaline Phosphatase 89 U/L      Total Bilirubin 0.4 mg/dL      Globulin " Anesthesia Pre-Procedure Evaluation    Patient: Luis Epstein   MRN: [id number] : 1996        Preoperative Diagnosis: Chronic right maxillary sinusitis [J32.0]  Deviated nasal septum [J34.2]  Chronic tonsillitis [J35.01]    Procedure : Procedure(s):  SEPTOPLASTY, NOSE, WITH TURBINOPLASTY  TONSILLECTOMY  MAXILLARY ANTROSTOMY, RIGHT ONLY          Past Medical History:   Diagnosis Date     Current moderate episode of major depressive disorder, unspecified whether recurrent (H)      Gender dysphoria in adult      Motion sickness       Past Surgical History:   Procedure Laterality Date     MASTECTOMY, BILATERAL  2016      No Known Allergies   Social History     Tobacco Use     Smoking status: Never Smoker     Smokeless tobacco: Never Used   Substance Use Topics     Alcohol use: Yes      Wt Readings from Last 1 Encounters:   21 87.1 kg (192 lb)        Anesthesia Evaluation            ROS/MED HX  ENT/Pulmonary:       Neurologic:     (+) migraines,     Cardiovascular:       METS/Exercise Tolerance:     Hematologic:       Musculoskeletal:       GI/Hepatic:       Renal/Genitourinary:       Endo:       Psychiatric/Substance Use:     (+) psychiatric history anxiety and depression     Infectious Disease:       Malignancy:       Other:            Physical Exam    Airway  airway exam normal      Mallampati: II   TM distance: > 3 FB   Neck ROM: full   Mouth opening: > 3 cm    Respiratory Devices and Support         Dental  no notable dental history         Cardiovascular          Rhythm and rate: regular and normal     Pulmonary   pulmonary exam normal        breath sounds clear to auscultation           OUTSIDE LABS:  CBC:   Lab Results   Component Value Date    WBC 6.3 2021    WBC 6.4 2021    HGB 16.1 2021    HGB 15.8 2021    HCT 47.1 2021    HCT 45.7 2021     2021     2021     BMP:   Lab Results   Component Value Date     2021    .8  01/21/2021    POTASSIUM 3.8 07/30/2021    POTASSIUM 3.8 01/21/2021    CHLORIDE 110 (H) 07/30/2021    CHLORIDE 104.4 01/21/2021    CO2 29 07/30/2021    CO2 26.6 01/21/2021    BUN 11 07/30/2021    BUN 17.7 01/21/2021    CR 1.16 07/30/2021    CR 1.0 01/21/2021    GLC 92 07/30/2021    .8 (H) 01/21/2021     COAGS: No results found for: PTT, INR, FIBR  POC: No results found for: BGM, HCG, HCGS  HEPATIC:   Lab Results   Component Value Date    ALBUMIN 4.1 02/24/2021    PROTTOTAL 7.5 02/24/2021    ALT 51 (H) 11/26/2021    AST 31 11/26/2021    ALKPHOS 97 02/24/2021    BILITOTAL 0.3 02/24/2021     OTHER:   Lab Results   Component Value Date    GEORGE 8.9 07/30/2021    TSH 1.45 12/05/2019       Anesthesia Plan    ASA Status:  1   NPO Status:  NPO Appropriate    Anesthesia Type: General.     - Airway: ETT   Induction: Intravenous.   Maintenance: Balanced.        Consents    Anesthesia Plan(s) and associated risks, benefits, and realistic alternatives discussed. Questions answered and patient/representative(s) expressed understanding.    - Discussed:     - Discussed with:  Patient      - Extended Intubation/Ventilatory Support Discussed: No.      - Patient is DNR/DNI Status: No    Use of blood products discussed: No .     Postoperative Care    Pain management: IV analgesics, Oral pain medications, Multi-modal analgesia.   PONV prophylaxis: Ondansetron (or other 5HT-3), Background Propofol Infusion, Dexamethasone or Solumedrol, Scopolamine patch     Comments:                Edison Torres MD   2.7 gm/dL      A/G Ratio 1.6 g/dL      BUN/Creatinine Ratio 16.0     Anion Gap 17.2 mmol/L      eGFR 36.1 mL/min/1.73     Narrative:      GFR Normal >60  Chronic Kidney Disease <60  Kidney Failure <15    The GFR formula is only valid for adults with stable renal function between ages 18 and 70.    CBC & Differential [[id number]]  (Abnormal) Collected: 02/28/24 0447    Specimen: Blood Updated: 02/28/24 0605    Narrative:      The following orders were created for panel order CBC & Differential.  Procedure                               Abnormality         Status                     ---------                               -----------         ------                     CBC Auto Differential[[id number]]        Abnormal            Final result               Scan Slide[[id number]]                                                                    Please view results for these tests on the individual orders.    CBC Auto Differential [[id number]]  (Abnormal) Collected: 02/28/24 0447    Specimen: Blood Updated: 02/28/24 0558     WBC 14.15 10*3/mm3      RBC 6.76 10*6/mm3      Hemoglobin 15.4 g/dL      Hematocrit 49.5 %      MCV 73.2 fL      MCH 22.8 pg      MCHC 31.1 g/dL      RDW 20.6 %      RDW-SD 47.8 fl      MPV 11.6 fL      Platelets 446 10*3/mm3      Neutrophil % 85.7 %      Lymphocyte % 5.7 %      Monocyte % 6.8 %      Eosinophil % 0.1 %      Basophil % 0.4 %      Immature Grans % 1.3 %      Neutrophils, Absolute 12.14 10*3/mm3      Lymphocytes, Absolute 0.81 10*3/mm3      Monocytes, Absolute 0.96 10*3/mm3      Eosinophils, Absolute 0.01 10*3/mm3      Basophils, Absolute 0.05 10*3/mm3      Immature Grans, Absolute 0.18 10*3/mm3      nRBC 0.0 /100 WBC     POC Glucose Once [[id number]]  (Abnormal) Collected: 02/27/24 2125    Specimen: Blood Updated: 02/27/24 2131     Glucose 226 mg/dL     POC Glucose Once [[id number]]  (Abnormal) Collected: 02/27/24 1848    Specimen: Blood Updated: 02/27/24 1857     Glucose 471 mg/dL     TSH  [[id number]]  (Normal) Collected: 02/27/24 1136    Specimen: Blood from Arm, Right Updated: 02/27/24 1811     TSH 1.770 uIU/mL     Hemoglobin A1c [[id number]]  (Abnormal) Collected: 02/27/24 0942    Specimen: Blood Updated: 02/27/24 1806     Hemoglobin A1C 6.10 %     Narrative:      Hemoglobin A1C Ranges:    Increased Risk for Diabetes  5.7% to 6.4%  Diabetes                     >= 6.5%  Diabetic Goal                < 7.0%    Respiratory Panel PCR w/COVID-19(SARS-CoV-2) BLANCO/JUAN/NAHEED/PAD/COR/CHRISTINE In-House, NP Swab in UTM/VTM, 2 HR TAT - Swab, Nasopharynx [[id number]]  (Normal) Collected: 02/27/24 1050    Specimen: Swab from Nasopharynx Updated: 02/27/24 1658     ADENOVIRUS, PCR Not Detected     Coronavirus 229E Not Detected     Coronavirus HKU1 Not Detected     Coronavirus NL63 Not Detected     Coronavirus OC43 Not Detected     COVID19 Not Detected     Human Metapneumovirus Not Detected     Human Rhinovirus/Enterovirus Not Detected     Influenza A PCR Not Detected     Influenza B PCR Not Detected     Parainfluenza Virus 1 Not Detected     Parainfluenza Virus 2 Not Detected     Parainfluenza Virus 3 Not Detected     Parainfluenza Virus 4 Not Detected     RSV, PCR Not Detected     Bordetella pertussis pcr Not Detected     Bordetella parapertussis PCR Not Detected     Chlamydophila pneumoniae PCR Not Detected     Mycoplasma pneumo by PCR Not Detected    Narrative:      In the setting of a positive respiratory panel with a viral infection PLUS a negative procalcitonin without other underlying concern for bacterial infection, consider observing off antibiotics or discontinuation of antibiotics and continue supportive care. If the respiratory panel is positive for atypical bacterial infection (Bordetella pertussis, Chlamydophila pneumoniae, or Mycoplasma pneumoniae), consider antibiotic de-escalation to target atypical bacterial infection.    Magnesium [[id number]]  (Abnormal) Collected: 02/27/24 1136    Specimen: Blood from  Arm, Right Updated: 02/27/24 1623     Magnesium 1.1 mg/dL     Ethanol [[id number]] Collected: 02/27/24 1136    Specimen: Blood from Arm, Right Updated: 02/27/24 1537     Ethanol <10 mg/dL      Ethanol % <0.010 %     Lipid Panel [[id number]]  (Abnormal) Collected: 02/27/24 1136    Specimen: Blood from Arm, Right Updated: 02/27/24 1537     Total Cholesterol 241 mg/dL      Triglycerides 154 mg/dL      HDL Cholesterol 42 mg/dL      LDL Cholesterol  171 mg/dL      VLDL Cholesterol 28 mg/dL      LDL/HDL Ratio 4.00    Narrative:      Cholesterol Reference Ranges  (U.S. Department of Health and Human Services ATP III Classifications)    Desirable          <200 mg/dL  Borderline High    200-239 mg/dL  High Risk          >240 mg/dL      Triglyceride Reference Ranges  (U.S. Department of Health and Human Services ATP III Classifications)    Normal           <150 mg/dL  Borderline High  150-199 mg/dL  High             200-499 mg/dL  Very High        >500 mg/dL    HDL Reference Ranges  (U.S. Department of Health and Human Services ATP III Classifications)    Low     <40 mg/dl (major risk factor for CHD)  High    >60 mg/dl ('negative' risk factor for CHD)        LDL Reference Ranges  (U.S. Department of Health and Human Services ATP III Classifications)    Optimal          <100 mg/dL  Near Optimal     100-129 mg/dL  Borderline High  130-159 mg/dL  High             160-189 mg/dL  Very High        >189 mg/dL    High Sensitivity Troponin T 2Hr [[id number]]  (Abnormal) Collected: 02/27/24 1136    Specimen: Blood from Arm, Right Updated: 02/27/24 1207     HS Troponin T 54 ng/L      Troponin T Delta -11 ng/L     Narrative:      High Sensitive Troponin T Reference Range:  <14.0 ng/L- Negative Female for AMI  <22.0 ng/L- Negative Male for AMI  >=14 - Abnormal Female indicating possible myocardial injury.  >=22 - Abnormal Male indicating possible myocardial injury.   Clinicians would have to utilize clinical acumen, EKG, Troponin, and serial  "changes to determine if it is an Acute Myocardial Infarction or myocardial injury due to an underlying chronic condition.         Lactic Acid, Plasma [[id number]]  (Normal) Collected: 02/27/24 1056    Specimen: Blood from Arm, Left Updated: 02/27/24 1147     Lactate 1.9 mmol/L     Blood Culture - Blood, Arm, Left [[id number]] Collected: 02/27/24 1056    Specimen: Blood from Arm, Left Updated: 02/27/24 1136    Blood Culture - Blood, Arm, Right [[id number]] Collected: 02/27/24 1106    Specimen: Blood from Arm, Right Updated: 02/27/24 1136    Procalcitonin [[id number]]  (Normal) Collected: 02/27/24 0942    Specimen: Blood Updated: 02/27/24 1118     Procalcitonin 0.05 ng/mL     Narrative:      As a Marker for Sepsis (Non-Neonates):    1. <0.5 ng/mL represents a low risk of severe sepsis and/or septic shock.  2. >2 ng/mL represents a high risk of severe sepsis and/or septic shock.    As a Marker for Lower Respiratory Tract Infections that require antibiotic therapy:    PCT on Admission    Antibiotic Therapy       6-12 Hrs later    >0.5                Strongly Recommended  >0.25 - <0.5        Recommended   0.1 - 0.25          Discouraged              Remeasure/reassess PCT  <0.1                Strongly Discouraged     Remeasure/reassess PCT    As 28 day mortality risk marker: \"Change in Procalcitonin Result\" (>80% or <=80%) if Day 0 (or Day 1) and Day 4 values are available. Refer to http://www.[facility]-pct-calculator.com    Change in PCT <=80%  A decrease of PCT levels below or equal to 80% defines a positive change in PCT test result representing a higher risk for 28-day all-cause mortality of patients diagnosed with severe sepsis for septic shock.    Change in PCT >80%  A decrease of PCT levels of more than 80% defines a negative change in PCT result representing a lower risk for 28-day all-cause mortality of patients diagnosed with severe sepsis or septic shock.       BNP [[id number]]  (Abnormal) Collected: 02/27/24 0942    " Specimen: Blood Updated: 02/27/24 1106     proBNP 13,365.0 pg/mL     Narrative:      This assay is used as an aid in the diagnosis of individuals suspected of having heart failure. It can be used as an aid in the diagnosis of acute decompensated heart failure (ADHF) in patients presenting with signs and symptoms of ADHF to the emergency department (ED). In addition, NT-proBNP of <300 pg/mL indicates ADHF is not likely.    Age Range Result Interpretation  NT-proBNP Concentration (pg/mL:      <50             Positive            >450                   Gray                 300-450                    Negative             <300    50-75           Positive            >900                  Gray                300-900                  Negative            <300      >75             Positive            >1800                  Gray                300-1800                  Negative            <300    Rapid Strep A Screen - Swab, Throat [[id number]]  (Normal) Collected: 02/27/24 1001    Specimen: Swab from Throat Updated: 02/27/24 1047     Strep A Ag Negative    Beta Strep Culture, Throat - Swab, Throat [[id number]] Collected: 02/27/24 1001    Specimen: Swab from Throat Updated: 02/27/24 1047    High Sensitivity Troponin T [[id number]]  (Abnormal) Collected: 02/27/24 0942    Specimen: Blood Updated: 02/27/24 1032     HS Troponin T 65 ng/L     Narrative:      High Sensitive Troponin T Reference Range:  <14.0 ng/L- Negative Female for AMI  <22.0 ng/L- Negative Male for AMI  >=14 - Abnormal Female indicating possible myocardial injury.  >=22 - Abnormal Male indicating possible myocardial injury.   Clinicians would have to utilize clinical acumen, EKG, Troponin, and serial changes to determine if it is an Acute Myocardial Infarction or myocardial injury due to an underlying chronic condition.         CBC & Differential [[id number]]  (Abnormal) Collected: 02/27/24 0942    Specimen: Blood Updated: 02/27/24 1031    Narrative:      The  following orders were created for panel order CBC & Differential.  Procedure                               Abnormality         Status                     ---------                               -----------         ------                     CBC Auto Differential[[id number]]        Abnormal            Final result                 Please view results for these tests on the individual orders.    CBC Auto Differential [[id number]]  (Abnormal) Collected: 02/27/24 0942    Specimen: Blood Updated: 02/27/24 1031     WBC 13.28 10*3/mm3      RBC 6.80 10*6/mm3      Hemoglobin 15.4 g/dL      Hematocrit 51.8 %      MCV 76.2 fL      MCH 22.6 pg      MCHC 29.7 g/dL      RDW 20.6 %      RDW-SD 50.5 fl      MPV 11.3 fL      Platelets 383 10*3/mm3      Neutrophil % 84.3 %      Lymphocyte % 7.0 %      Monocyte % 6.0 %      Eosinophil % 0.7 %      Basophil % 0.9 %      Immature Grans % 1.1 %      Neutrophils, Absolute 11.20 10*3/mm3      Lymphocytes, Absolute 0.93 10*3/mm3      Monocytes, Absolute 0.80 10*3/mm3      Eosinophils, Absolute 0.09 10*3/mm3      Basophils, Absolute 0.12 10*3/mm3      Immature Grans, Absolute 0.14 10*3/mm3      nRBC 0.0 /100 WBC     COVID-19 and FLU A/B PCR, 1 HR TAT - Swab, Nasopharynx [[id number]]  (Normal) Collected: 02/27/24 0942    Specimen: Swab from Nasopharynx Updated: 02/27/24 1017     COVID19 Not Detected     Influenza A PCR Not Detected     Influenza B PCR Not Detected    Narrative:      Fact sheet for providers: https://www.fda.gov/media/160018/download    Fact sheet for patients: https://www.fda.gov/media/308097/download    Test performed by PCR.    Comprehensive Metabolic Panel [[id number]]  (Abnormal) Collected: 02/27/24 0942    Specimen: Blood Updated: 02/27/24 1015     Glucose 158 mg/dL      BUN 21 mg/dL      Creatinine 1.65 mg/dL      Sodium 139 mmol/L      Potassium 3.8 mmol/L      Chloride 100 mmol/L      CO2 23.1 mmol/L      Calcium 9.4 mg/dL      Total Protein 7.2 g/dL      Albumin 4.4 g/dL   "    ALT (SGPT) 13 U/L      AST (SGOT) 14 U/L      Alkaline Phosphatase 90 U/L      Total Bilirubin 0.6 mg/dL      Globulin 2.8 gm/dL      A/G Ratio 1.6 g/dL      BUN/Creatinine Ratio 12.7     Anion Gap 15.9 mmol/L      eGFR 42.2 mL/min/1.73     Narrative:      GFR Normal >60  Chronic Kidney Disease <60  Kidney Failure <15    The GFR formula is only valid for adults with stable renal function between ages 18 and 70.    D-dimer, Quantitative [[id number]]  (Abnormal) Collected: 02/27/24 0942    Specimen: Blood Updated: 02/27/24 1013     D-Dimer, Quantitative 1.86 MCGFEU/mL     Narrative:      According to the assay 's published package insert, a normal (<0.50 MCGFEU/mL) D-dimer result in conjunction with a non-high clinical probability assessment, excludes deep vein thrombosis (DVT) and pulmonary embolism (PE) with high sensitivity.    D-dimer values increase with age and this can make VTE exclusion of an older population difficult. To address this, the [employer], based on best available evidence and recent guidelines, recommends that clinicians use age-adjusted D-dimer thresholds in patients greater than 50 years of age with: a) a low probability of PE who do not meet all Pulmonary Embolism Rule Out Criteria, or b) in those with intermediate probability of PE.   The formula for an age-adjusted D-dimer cut-off is \"age/100\".  For example, a 60 year old patient would have an age-adjusted cut-off of 0.60 MCGFEU/mL and an 80 year old 0.80 MCGFEU/mL.            Imaging Results (Last 24 Hours)       Procedure Component Value Units Date/Time    CT Angiogram Chest [[id number]] Collected: 02/27/24 1200     Updated: 02/27/24 1221    Narrative:      CT ANGIOGRAM CHEST    Date of Exam: 2/27/2024 11:44 AM EST    Indication: Short of air, elevated D-dimer.    Comparison: None available.    Technique: CTA of the chest was performed after the uneventful intravenous administration of iodinated contrast. " Reconstructed coronal and sagittal images were also obtained. In addition, a 3-D volume rendered image was created for interpretation.   Automated exposure control and iterative reconstruction methods were used.    FINDINGS:  No significant focal filling defects are identified to indicate the presence of pulmonary embolism.    There are ill-defined multifocal groundglass infiltrates throughout the lungs bilaterally with diffuse interstitial changes. Bilateral pleural effusions are also noted. Somewhat more pronounced infiltrates are noted at the medial aspect of the right   upper lobe and within the bilateral lower lobes. The findings suggest developing atypical/viral infection or multifocal pneumonia. Changes secondary to CHF and pulmonary edema may also be considered.    Bilateral hilar and mediastinal lymphadenopathy is observed. These changes may be reactive in nature. Aortic atherosclerosis is noted. There is dilatation of the ascending thoracic aorta measuring 4.0 cm indicating an ascending thoracic aortic aneurysm.   Severe coronary artery calcifications are observed. The thyroid gland is unremarkable. The esophagus is unremarkable.    The limited evaluation of the upper abdomen demonstrates no evidence for acute abnormality.    There is severe age-indeterminate compression fracture deformity of the T12 vertebral body with anterior wedge deformity and biconcave deformity. There is mild retropulsion of the posterior superior endplate fracture fragment. No significant associated   malalignment is observed.      Impression:      1.No evidence for pulmonary embolism.  2.Extensive multifocal diffuse airspace infiltrates and diffuse interstitial changes throughout the lungs bilaterally. Associated pleural effusions are observed. The findings suggest changes of potential developing atypical/viral infection or multifocal   pneumonia. Changes of CHF and pulmonary edema may also contribute to these findings. Recommend  correlation for signs or symptoms of acute infection and follow-up to ensure improvement/resolution.  3.Bilateral hilar and mediastinal lymphadenopathy likely reactive in nature. Recommend follow-up to ensure resolution.  4.Severe coronary artery calcifications are noted.  5.An ascending thoracic aortic aneurysm is noted measuring 4.0 cm in AP dimension.  6.Severe age-indeterminate compression fracture deformity of the T12 vertebral level. There is no significant associated malalignment.        Electronically Signed: Robert Reed MD    2/27/2024 12:15 PM EST    Workstation ID: [id number]    XR Chest 1 View [[id number]] Collected: 02/27/24 1033     Updated: 02/27/24 1045    Narrative:      XR CHEST 1 VW    Date of Exam: 2/27/2024 10:16 AM EST    Indication: Short of air shortness of breath for 2 to 3 days. Worsening this morning. History of smoking.    Comparison: None available.    FINDINGS:  There are ill-defined multifocal airspace infiltrates throughout the lungs bilaterally with associated diffuse interstitial changes. No pleural effusions are observed. The cardiac silhouette and mediastinum are unremarkable. No acute osseous   abnormalities are identified.      Impression:      1.Ill-defined multifocal airspace infiltrates bilaterally with diffuse interstitial changes. The findings suggest developing atypical/viral infection or multifocal pneumonia. Changes of COVID-19 infection may be included in the differential. Changes of   CHF and pulmonary edema may also be considered. Recommend correlation for signs or symptoms of acute infection and follow-up to ensure improvement/resolution.        Electronically Signed: Robert Reed MD    2/27/2024 10:35 AM EST    Workstation ID: [id number]              Medication Review:   I have reviewed the patient's current medication list    Current Facility-Administered Medications:     allopurinol (ZYLOPRIM) tablet 200 mg, 200 mg, Oral, Daily, Anand Vieira MD, 200 mg at  02/28/24 0849    atorvastatin (LIPITOR) tablet 80 mg, 80 mg, Oral, Nightly, Anand Vieira MD, 80 mg at 02/27/24 2038    clopidogrel (PLAVIX) tablet 75 mg, 75 mg, Oral, Daily, Anand Vieira MD, 75 mg at 02/28/24 0849    dextrose (D50W) (25 g/50 mL) IV injection 10-50 mL, 10-50 mL, Intravenous, Q15 Min PRN, Anand Vieira MD    dextrose (GLUTOSE) oral gel 15 g, 15 g, Oral, Q15 Min PRN, Anand Vieira MD    FLUoxetine (PROzac) capsule 60 mg, 60 mg, Oral, Daily, Anand Vieira MD, 60 mg at 02/28/24 0849    folic acid (FOLVITE) tablet 1 mg, 1 mg, Oral, Daily, Anand Vieira MD, 1 mg at 02/28/24 0849    gabapentin (NEURONTIN) capsule 300 mg, 300 mg, Oral, Q8H, Anand Vieira MD, 300 mg at 02/28/24 0552    glipizide (GLUCOTROL) tablet 5 mg, 5 mg, Oral, QAM AC, Anand Vieira MD, 5 mg at 02/28/24 0853    glucagon (GLUCAGEN) injection 1 mg, 1 mg, Intramuscular, Q15 Min PRN, Anand Vieira MD    HYDROcodone-acetaminophen (NORCO)  MG per tablet 1 tablet, 1 tablet, Oral, Q6H PRN, Anand Vieira MD, 1 tablet at 02/28/24 0849    insulin aspart (novoLOG) injection 1-200 Units, 1-200 Units, Subcutaneous, 4x Daily With Meals & Nightly, Anand Vieira MD, 8 Units at 02/28/24 0853    insulin aspart (novoLOG) injection 1-200 Units, 1-200 Units, Subcutaneous, PRN, Anand Vieira MD    insulin detemir (LEVEMIR) injection 1-200 Units, 1-200 Units, Subcutaneous, Nightly - Glucommander, Anand Vieira MD, 23 Units at 02/27/24 2141    levETIRAcetam (KEPPRA) tablet 500 mg, 500 mg, Oral, Q12H, Anand Vieira MD, 500 mg at 02/28/24 0849    levothyroxine (SYNTHROID, LEVOTHROID) tablet 75 mcg, 75 mcg, Oral, Q AM, Anand Vieira MD, 75 mcg at 02/28/24 0552    lisinopril (PRINIVIL,ZESTRIL) tablet 20 mg, 20 mg, Oral, Q24H, Anand Vieira MD, 20 mg at 02/28/24 0849    LORazepam (ATIVAN) tablet 2 mg, 2 mg, Oral, Q6H, 2 mg at 02/28/24 0439 **FOLLOWED BY** LORazepam (ATIVAN) tablet 1 mg, 1 mg, Oral, Q6H, Anand Vieira MD     LORazepam (ATIVAN) tablet 1 mg, 1 mg, Oral, Q1H PRN **OR** Midazolam HCl (PF) (VERSED) injection 2 mg, 2 mg, Intravenous, Q1H PRN **OR** LORazepam (ATIVAN) tablet 2 mg, 2 mg, Oral, Q1H PRN **OR** Midazolam HCl (PF) (VERSED) injection 4 mg, 4 mg, Intravenous, Q1H PRN **OR** Midazolam HCl (PF) (VERSED) injection 4 mg, 4 mg, Intravenous, Q15 Min PRN **OR** Midazolam HCl (PF) (VERSED) injection 4 mg, 4 mg, Intramuscular, Q15 Min PRN, Anand Vieira MD    Magnesium Standard Dose Replacement - Follow Nurse / BPA Driven Protocol, , Does not apply, Dariel FLORES Rusty T, MD    nitroglycerin (NITROSTAT) SL tablet 0.4 mg, 0.4 mg, Sublingual, Q5 Min PRN, Anand Vieira MD    pantoprazole (PROTONIX) EC tablet 40 mg, 40 mg, Oral, Q AM, Anand Vieira MD, 40 mg at 02/28/24 0552    rivaroxaban (XARELTO) tablet 10 mg, 10 mg, Oral, Daily With Dinner, Anand Vieira MD, 10 mg at 02/27/24 1859    thiamine (B-1) injection 200 mg, 200 mg, Intravenous, Q8H, 200 mg at 02/28/24 0552 **FOLLOWED BY** [START ON 3/3/2024] thiamine (VITAMIN B-1) tablet 100 mg, 100 mg, Oral, Daily, Anand Vieira MD      Assessment & Plan     Acute-on-Chronic HFrEF exacerbation: Net I&O balance was approximately -1.5 liters.  He still has no O2 requirement.  Echo demonstrates reduced EF and global hypokinesis.  His previous echo revealed EF of 45% w/ inferior hypokinesis.  Cardiology following.  Watch creatinine.  Sinus Tachycardia: HR has improved w/ treatment of EtOH withdrawal.  Leukocytosis: Etiology unclear.  Repeat procalcitonin.  Viral panel was negative.  Will continue to trend.  EtOH Abuse/Withdrawal: Continue protocol.  Diabetes Mellitus, Type 2: I've resumed his [facility] recorded medications.  Continue Glucommander.  CAD: Resumed home medications.  No reported chest pain.  Cardiology following.  Hx/O Seizure: resumed home Keppra.    Plan for disposition: Home soon.    Anand Vieira MD  02/28/24  09:15 EST

## 2024-02-29 ENCOUNTER — APPOINTMENT (OUTPATIENT)
Dept: ULTRASOUND IMAGING | Facility: HOSPITAL | Age: 79
DRG: 291 | End: 2024-02-29
Payer: OTHER GOVERNMENT

## 2024-02-29 PROBLEM — I50.9 ACUTE EXACERBATION OF CHF (CONGESTIVE HEART FAILURE): Status: ACTIVE | Noted: 2024-02-29

## 2024-02-29 LAB
ANION GAP SERPL CALCULATED.3IONS-SCNC: 13.8 MMOL/L (ref 5–15)
ANION GAP SERPL CALCULATED.3IONS-SCNC: 14.8 MMOL/L (ref 5–15)
BACTERIA SPEC AEROBE CULT: NORMAL
BASOPHILS # BLD AUTO: 0.12 10*3/MM3 (ref 0–0.2)
BASOPHILS NFR BLD AUTO: 0.9 % (ref 0–1.5)
BUN SERPL-MCNC: 43 MG/DL (ref 8–23)
BUN SERPL-MCNC: 43 MG/DL (ref 8–23)
BUN/CREAT SERPL: 21.7 (ref 7–25)
BUN/CREAT SERPL: 21.9 (ref 7–25)
CALCIUM SPEC-SCNC: 8.9 MG/DL (ref 8.6–10.5)
CALCIUM SPEC-SCNC: 9.1 MG/DL (ref 8.6–10.5)
CHLORIDE SERPL-SCNC: 103 MMOL/L (ref 98–107)
CHLORIDE SERPL-SCNC: 104 MMOL/L (ref 98–107)
CO2 SERPL-SCNC: 22.2 MMOL/L (ref 22–29)
CO2 SERPL-SCNC: 22.2 MMOL/L (ref 22–29)
CREAT SERPL-MCNC: 1.96 MG/DL (ref 0.76–1.27)
CREAT SERPL-MCNC: 1.98 MG/DL (ref 0.76–1.27)
DEPRECATED RDW RBC AUTO: 49.5 FL (ref 37–54)
EGFRCR SERPLBLD CKD-EPI 2021: 33.9 ML/MIN/1.73
EGFRCR SERPLBLD CKD-EPI 2021: 34.4 ML/MIN/1.73
EOSINOPHIL # BLD AUTO: 0.18 10*3/MM3 (ref 0–0.4)
EOSINOPHIL NFR BLD AUTO: 1.3 % (ref 0.3–6.2)
ERYTHROCYTE [DISTWIDTH] IN BLOOD BY AUTOMATED COUNT: 20.4 % (ref 12.3–15.4)
GLUCOSE BLDC GLUCOMTR-MCNC: 130 MG/DL (ref 70–130)
GLUCOSE BLDC GLUCOMTR-MCNC: 139 MG/DL (ref 70–130)
GLUCOSE BLDC GLUCOMTR-MCNC: 146 MG/DL (ref 70–130)
GLUCOSE BLDC GLUCOMTR-MCNC: 70 MG/DL (ref 70–130)
GLUCOSE SERPL-MCNC: 146 MG/DL (ref 65–99)
GLUCOSE SERPL-MCNC: 68 MG/DL (ref 65–99)
HCT VFR BLD AUTO: 48.6 % (ref 37.5–51)
HGB BLD-MCNC: 14.9 G/DL (ref 13–17.7)
IMM GRANULOCYTES # BLD AUTO: 0.2 10*3/MM3 (ref 0–0.05)
IMM GRANULOCYTES NFR BLD AUTO: 1.4 % (ref 0–0.5)
LYMPHOCYTES # BLD AUTO: 1.46 10*3/MM3 (ref 0.7–3.1)
LYMPHOCYTES NFR BLD AUTO: 10.5 % (ref 19.6–45.3)
MCH RBC QN AUTO: 22.7 PG (ref 26.6–33)
MCHC RBC AUTO-ENTMCNC: 30.7 G/DL (ref 31.5–35.7)
MCV RBC AUTO: 74.2 FL (ref 79–97)
MONOCYTES # BLD AUTO: 0.75 10*3/MM3 (ref 0.1–0.9)
MONOCYTES NFR BLD AUTO: 5.4 % (ref 5–12)
NEUTROPHILS NFR BLD AUTO: 11.13 10*3/MM3 (ref 1.7–7)
NEUTROPHILS NFR BLD AUTO: 80.5 % (ref 42.7–76)
NRBC BLD AUTO-RTO: 0 /100 WBC (ref 0–0.2)
PLATELET # BLD AUTO: 369 10*3/MM3 (ref 140–450)
PMV BLD AUTO: 11.7 FL (ref 6–12)
POTASSIUM SERPL-SCNC: 4 MMOL/L (ref 3.5–5.2)
POTASSIUM SERPL-SCNC: 4.1 MMOL/L (ref 3.5–5.2)
RBC # BLD AUTO: 6.55 10*6/MM3 (ref 4.14–5.8)
RBC MORPH BLD: NORMAL
SMALL PLATELETS BLD QL SMEAR: ADEQUATE
SODIUM SERPL-SCNC: 139 MMOL/L (ref 136–145)
SODIUM SERPL-SCNC: 141 MMOL/L (ref 136–145)
WBC MORPH BLD: NORMAL
WBC NRBC COR # BLD AUTO: 13.84 10*3/MM3 (ref 3.4–10.8)

## 2024-02-29 PROCEDURE — 94799 UNLISTED PULMONARY SVC/PX: CPT

## 2024-02-29 PROCEDURE — 85007 BL SMEAR W/DIFF WBC COUNT: CPT | Performed by: HOSPITALIST

## 2024-02-29 PROCEDURE — 63710000001 INSULIN ASPART PER 5 UNITS: Performed by: HOSPITALIST

## 2024-02-29 PROCEDURE — 25010000002 THIAMINE HCL 200 MG/2ML SOLUTION: Performed by: HOSPITALIST

## 2024-02-29 PROCEDURE — 94761 N-INVAS EAR/PLS OXIMETRY MLT: CPT

## 2024-02-29 PROCEDURE — 63710000001 INSULIN DETEMIR PER 5 UNITS: Performed by: HOSPITALIST

## 2024-02-29 PROCEDURE — 76775 US EXAM ABDO BACK WALL LIM: CPT

## 2024-02-29 PROCEDURE — 82948 REAGENT STRIP/BLOOD GLUCOSE: CPT

## 2024-02-29 PROCEDURE — 80048 BASIC METABOLIC PNL TOTAL CA: CPT | Performed by: HOSPITALIST

## 2024-02-29 PROCEDURE — 99232 SBSQ HOSP IP/OBS MODERATE 35: CPT | Performed by: INTERNAL MEDICINE

## 2024-02-29 PROCEDURE — 25810000003 SODIUM CHLORIDE 0.9 % SOLUTION: Performed by: HOSPITALIST

## 2024-02-29 PROCEDURE — 99233 SBSQ HOSP IP/OBS HIGH 50: CPT | Performed by: HOSPITALIST

## 2024-02-29 PROCEDURE — 85025 COMPLETE CBC W/AUTO DIFF WBC: CPT | Performed by: HOSPITALIST

## 2024-02-29 RX ORDER — FLUOXETINE HYDROCHLORIDE 20 MG/1
60 CAPSULE ORAL DAILY
Qty: 30 CAPSULE | Refills: 1 | Status: SHIPPED | OUTPATIENT
Start: 2024-02-29 | End: 2024-02-29 | Stop reason: SDUPTHER

## 2024-02-29 RX ORDER — ALLOPURINOL 200 MG/1
200 TABLET ORAL DAILY
Qty: 30 TABLET | Refills: 1 | Status: SHIPPED | OUTPATIENT
Start: 2024-02-29

## 2024-02-29 RX ORDER — FOLIC ACID 1 MG/1
1 TABLET ORAL DAILY
Qty: 30 TABLET | Refills: 1 | Status: SHIPPED | OUTPATIENT
Start: 2024-02-29 | End: 2024-02-29 | Stop reason: SDUPTHER

## 2024-02-29 RX ORDER — FOLIC ACID 1 MG/1
1 TABLET ORAL DAILY
Qty: 30 TABLET | Refills: 1 | Status: SHIPPED | OUTPATIENT
Start: 2024-02-29

## 2024-02-29 RX ORDER — METOPROLOL SUCCINATE 25 MG/1
25 TABLET, EXTENDED RELEASE ORAL
Qty: 30 TABLET | Refills: 1 | Status: SHIPPED | OUTPATIENT
Start: 2024-02-29

## 2024-02-29 RX ORDER — GAUZE BANDAGE 2" X 2"
100 BANDAGE TOPICAL DAILY
Qty: 30 TABLET | Refills: 1 | Status: SHIPPED | OUTPATIENT
Start: 2024-03-03

## 2024-02-29 RX ORDER — FLUOXETINE HYDROCHLORIDE 20 MG/1
60 CAPSULE ORAL DAILY
Qty: 30 CAPSULE | Refills: 1 | Status: SHIPPED | OUTPATIENT
Start: 2024-02-29

## 2024-02-29 RX ORDER — ALLOPURINOL 200 MG/1
200 TABLET ORAL DAILY
Qty: 30 TABLET | Refills: 1 | Status: SHIPPED | OUTPATIENT
Start: 2024-02-29 | End: 2024-02-29 | Stop reason: SDUPTHER

## 2024-02-29 RX ORDER — METOPROLOL SUCCINATE 25 MG/1
25 TABLET, EXTENDED RELEASE ORAL
Qty: 30 TABLET | Refills: 1 | Status: SHIPPED | OUTPATIENT
Start: 2024-02-29 | End: 2024-02-29 | Stop reason: SDUPTHER

## 2024-02-29 RX ORDER — GAUZE BANDAGE 2" X 2"
100 BANDAGE TOPICAL DAILY
Qty: 30 TABLET | Refills: 1 | Status: SHIPPED | OUTPATIENT
Start: 2024-03-03 | End: 2024-02-29 | Stop reason: SDUPTHER

## 2024-02-29 RX ADMIN — THIAMINE HYDROCHLORIDE 200 MG: 100 INJECTION, SOLUTION INTRAMUSCULAR; INTRAVENOUS at 14:02

## 2024-02-29 RX ADMIN — HYDROCODONE BITARTRATE AND ACETAMINOPHEN 1 TABLET: 10; 325 TABLET ORAL at 06:39

## 2024-02-29 RX ADMIN — CLOPIDOGREL BISULFATE 75 MG: 75 TABLET ORAL at 08:49

## 2024-02-29 RX ADMIN — THIAMINE HYDROCHLORIDE 200 MG: 100 INJECTION, SOLUTION INTRAMUSCULAR; INTRAVENOUS at 06:32

## 2024-02-29 RX ADMIN — GABAPENTIN 300 MG: 300 CAPSULE ORAL at 06:32

## 2024-02-29 RX ADMIN — FOLIC ACID 1 MG: 1 TABLET ORAL at 08:49

## 2024-02-29 RX ADMIN — SODIUM CHLORIDE 500 ML: 9 INJECTION, SOLUTION INTRAVENOUS at 08:46

## 2024-02-29 RX ADMIN — INSULIN ASPART 12 UNITS: 100 INJECTION, SOLUTION INTRAVENOUS; SUBCUTANEOUS at 17:28

## 2024-02-29 RX ADMIN — GABAPENTIN 300 MG: 300 CAPSULE ORAL at 14:02

## 2024-02-29 RX ADMIN — RIVAROXABAN 10 MG: 10 TABLET, FILM COATED ORAL at 17:29

## 2024-02-29 RX ADMIN — METOPROLOL SUCCINATE 25 MG: 25 TABLET, EXTENDED RELEASE ORAL at 08:49

## 2024-02-29 RX ADMIN — PANTOPRAZOLE SODIUM 40 MG: 40 TABLET, DELAYED RELEASE ORAL at 06:32

## 2024-02-29 RX ADMIN — LEVOTHYROXINE SODIUM 75 MCG: 75 TABLET ORAL at 06:32

## 2024-02-29 RX ADMIN — INSULIN ASPART 2 UNITS: 100 INJECTION, SOLUTION INTRAVENOUS; SUBCUTANEOUS at 21:17

## 2024-02-29 RX ADMIN — INSULIN ASPART 6 UNITS: 100 INJECTION, SOLUTION INTRAVENOUS; SUBCUTANEOUS at 08:59

## 2024-02-29 RX ADMIN — INSULIN DETEMIR 23 UNITS: 100 INJECTION, SOLUTION SUBCUTANEOUS at 21:18

## 2024-02-29 RX ADMIN — GABAPENTIN 300 MG: 300 CAPSULE ORAL at 21:19

## 2024-02-29 RX ADMIN — LISINOPRIL 20 MG: 20 TABLET ORAL at 08:48

## 2024-02-29 RX ADMIN — THIAMINE HYDROCHLORIDE 200 MG: 100 INJECTION, SOLUTION INTRAMUSCULAR; INTRAVENOUS at 21:19

## 2024-02-29 RX ADMIN — GLIPIZIDE 5 MG: 5 TABLET ORAL at 08:49

## 2024-02-29 RX ADMIN — ALLOPURINOL 200 MG: 100 TABLET ORAL at 08:48

## 2024-02-29 RX ADMIN — ATORVASTATIN CALCIUM 80 MG: 40 TABLET, FILM COATED ORAL at 21:18

## 2024-02-29 RX ADMIN — HYDROCODONE BITARTRATE AND ACETAMINOPHEN 1 TABLET: 10; 325 TABLET ORAL at 21:18

## 2024-02-29 RX ADMIN — FLUOXETINE HYDROCHLORIDE 60 MG: 20 CAPSULE ORAL at 08:48

## 2024-02-29 RX ADMIN — LEVETIRACETAM 500 MG: 500 TABLET, FILM COATED ORAL at 08:49

## 2024-02-29 RX ADMIN — EMPAGLIFLOZIN 10 MG: 10 TABLET, FILM COATED ORAL at 08:49

## 2024-02-29 RX ADMIN — INSULIN ASPART 4 UNITS: 100 INJECTION, SOLUTION INTRAVENOUS; SUBCUTANEOUS at 12:45

## 2024-02-29 RX ADMIN — LEVETIRACETAM 500 MG: 500 TABLET, FILM COATED ORAL at 21:19

## 2024-02-29 NOTE — PLAN OF CARE
Goal Outcome Evaluation:  Plan of Care Reviewed With: patient           Outcome Evaluation: CIWA negative throughout shift. No edema noted to BLE. Remains sinus tacch 104-107. Slept a majority of the shift. Patient on glucommander. No complaints. 0400 Ativean not given bc patient sleeping soundly with eyes closed.

## 2024-02-29 NOTE — PROGRESS NOTES
"    Patient Name: Sheron Han  :1945  78 y.o.      Patient Care Team:  Provider, No Known as PCP - General    Chief Complaint: Shortness of breath    Interval History: No chest pain or chest discomfort heart rate slightly over 100 overnight       Objective   Vital Signs  Temp:  [97 °F (36.1 °C)-98.4 °F (36.9 °C)] 98.4 °F (36.9 °C)  Heart Rate:  [103-109] 109  Resp:  [16-20] 18  BP: (100-139)/(65-82) 108/73    Intake/Output Summary (Last 24 hours) at 2024 0918  Last data filed at 2024 0639  Gross per 24 hour   Intake 480 ml   Output 1925 ml   Net -1445 ml     Flowsheet Rows      Flowsheet Row First Filed Value   Admission Height 180.3 cm (71\") Documented at 2024 0937   Admission Weight 90.7 kg (200 lb) Documented at 2024 0937            Physical Exam:   General Appearance:    Alert, cooperative, in no acute distress   Lungs:     Clear to auscultation.  Normal respiratory effort and rate.      Heart:    Regular rhythm and normal rate, normal S1 and S2, no murmurs, gallops or rubs.     Chest Wall:    No abnormalities observed   Abdomen:     Soft, nontender, positive bowel sounds.     Extremities:   no cyanosis, clubbing or edema.  No marked joint deformities.  Adequate musculoskeletal strength.       Results Review:    Results from last 7 days   Lab Units 24  0412   SODIUM mmol/L 139   POTASSIUM mmol/L 4.0   CHLORIDE mmol/L 103   CO2 mmol/L 22.2   BUN mg/dL 43*   CREATININE mg/dL 1.96*   GLUCOSE mg/dL 146*   CALCIUM mg/dL 9.1     Results from last 7 days   Lab Units 24  1136 24  0942   HSTROP T ng/L 54* 65*     Results from last 7 days   Lab Units 24  0412   WBC 10*3/mm3 13.84*   HEMOGLOBIN g/dL 14.9   HEMATOCRIT % 48.6   PLATELETS 10*3/mm3 369         Results from last 7 days   Lab Units 24  0447   MAGNESIUM mg/dL 2.4     Results from last 7 days   Lab Units 24  1136   CHOLESTEROL mg/dL 241*   TRIGLYCERIDES mg/dL 154*   HDL CHOL mg/dL 42   LDL CHOL " mg/dL 171*               Medication Review:   allopurinol, 200 mg, Oral, Daily  atorvastatin, 80 mg, Oral, Nightly  clopidogrel, 75 mg, Oral, Daily  empagliflozin, 10 mg, Oral, Daily  FLUoxetine, 60 mg, Oral, Daily  folic acid, 1 mg, Oral, Daily  gabapentin, 300 mg, Oral, Q8H  glipizide, 5 mg, Oral, QAM AC  insulin aspart, 1-200 Units, Subcutaneous, 4x Daily With Meals & Nightly  insulin detemir, 1-200 Units, Subcutaneous, Nightly - Glucommander  levETIRAcetam, 500 mg, Oral, Q12H  levothyroxine, 75 mcg, Oral, Q AM  lisinopril, 20 mg, Oral, Q24H  metoprolol succinate XL, 25 mg, Oral, Q24H  pantoprazole, 40 mg, Oral, Q AM  rivaroxaban, 10 mg, Oral, Daily With Dinner  sodium chloride, 500 mL, Intravenous, Once  thiamine (B-1) IV, 200 mg, Intravenous, Q8H   Followed by  [START ON 3/3/2024] thiamine, 100 mg, Oral, Daily              Assessment & Plan     Active Hospital Problems    Diagnosis  POA    **CHF (congestive heart failure) [I50.9]  Yes    KAREN (acute kidney injury) [N17.9]  Yes    Coronary artery disease [I25.10]  Yes    Hypertension [I10]  Yes    Alcohol abuse [F10.10]  Yes      Resolved Hospital Problems   No resolved problems to display.     1.  Acute on chronic systolic congestive heart failure-ejection 35%, newly diagnosed cardiomyopathy, consider alcohol abuse and/or ischemia.  Plan is for an outpatient ischemic evaluation.  Titrating medical therapy, patient currently is asymptomatic.  Currently on lisinopril, metoprolol succinate, empagliflozin, will continue to titrate guideline directed medical therapy as an outpatient  2.  Sinus tachycardia felt largely second to alcohol withdrawal, improving, continue on beta-blockade  3.  Coronary disease with history of myocardial infarction 2022 status post stent to the LAD.  No evidence of ACS/MI on the current exam  4.  Essential hypertension  5.  Alcohol abuse  6.  Acute renal failure, creatinine 1.96 diuretics on hold.    Patient hopes to be discharged today  and that would be fine from our standpoint, [facility]    Avinash Sheldon III, MD  [facility]  02/29/24  09:18 EST

## 2024-02-29 NOTE — PLAN OF CARE
Goal Outcome Evaluation:  Plan of Care Reviewed With: patient        Progress: improving  Outcome Evaluation: Pt up to chair, JACIELWA 0. Pt continues on Telemetry; S Tach 104-108; on glucommander.  Renal Panel in am. A& O x3. Will continue to monitor.

## 2024-02-29 NOTE — CONSULTS
[facility] Consult Note      Patient Name: Sheron Han  : 1945  MRN: [id number]  Primary Care Physician:  Provider, No Known  Referring Physician: No ref. provider found  Date of admission: 2024    Subjective     Reason for Consult: Acute kidney injury on stage IIIb chronic kidney disease.    HPI:   Sheron Han is a 78 y.o. male with a medical history significant for  Chronic kidney disease stage IIIb [serum creatinine 1.77 mg/dL on 2024 and 1.4 on 2022], CHF [EF 35%], CAD, diabetes mellitus hypertension who was admitted to this facility on 2024 for worsening shortness of breath.  CTA of the chest.  1.No evidence for pulmonary embolism.  2.Extensive multifocal diffuse airspace infiltrates and diffuse interstitial changes throughout the lungs bilaterally. Associated pleural effusions are observed. The findings suggest changes of potential developing atypical/viral infection or multifocal   pneumonia. Changes of CHF and pulmonary edema may also contribute to these findings. Recommend correlation for signs or symptoms of acute infection and follow-up to ensure improvement/resolution.  3.Bilateral hilar and mediastinal lymphadenopathy likely reactive in nature. Recommend follow-up to ensure resolution.  The patient has been diuresed.    Serum creatinine: 1.65 on 2024 and 1.98 on 2024.    Review of Systems:   14 point review of systems is otherwise negative except for mentioned above on HPI    Personal History     Past Medical History:   Diagnosis Date    Alcohol withdrawal     CHF (congestive heart failure)     Chronic pain     Coronary artery disease     Diabetes mellitus     Hypertension     Metabolic encephalopathy        History reviewed. No pertinent surgical history.    Family History: family history is not on file.    Social History:  reports that he quit smoking 9 days ago. His smoking use included cigarettes. He has a 25.00 pack-year  smoking history. He has never used smokeless tobacco. He reports that he does not currently use alcohol. He reports current drug use. Drug: Hydrocodone.    Home Medications:  Prior to Admission medications    Medication Sig Start Date End Date Taking? Authorizing Provider   Allopurinol 200 MG tablet Take 1 tablet by mouth Daily. 2/29/24  Yes Martina Avilez APRN   empagliflozin (JARDIANCE) 10 MG tablet tablet Take 1 tablet by mouth Daily. 2/29/24  Yes Martina Avilez APRN   FLUoxetine (PROzac) 20 MG capsule Take 3 capsules by mouth Daily. 2/29/24  Yes Martina Avilez APRN   folic acid (FOLVITE) 1 MG tablet Take 1 tablet by mouth Daily. 2/29/24  Yes Martina Avilez APRN   HYDROcodone-acetaminophen (NORCO)  MG per tablet Take 1 tablet by mouth Every 6 (Six) Hours As Needed for Moderate Pain.   Yes Provider, MD Lacho   metoprolol succinate XL (TOPROL-XL) 25 MG 24 hr tablet Take 1 tablet by mouth Daily. 2/29/24  Yes Martina Avilez APRN   Thiamine Mononitrate 100 MG tablet Take 1 tablet by mouth Daily. 3/3/24  Yes Martina Avilez APRN   Allopurinol 200 MG tablet Take 1 tablet by mouth Daily. 2/29/24 2/29/24 Yes Martina Avilez APRN   empagliflozin (JARDIANCE) 10 MG tablet tablet Take 1 tablet by mouth Daily. 2/29/24 2/29/24 Yes Martina Avilez APRN   FLUoxetine (PROzac) 20 MG capsule Take 3 capsules by mouth Daily. 2/29/24 2/29/24 Yes Martina Avilez APRN   folic acid (FOLVITE) 1 MG tablet Take 1 tablet by mouth Daily. 2/29/24 2/29/24 Yes Martina Avilez APRN   metoprolol succinate XL (TOPROL-XL) 25 MG 24 hr tablet Take 1 tablet by mouth Daily. 2/29/24 2/29/24 Yes Martina Avilez APRN   Thiamine Mononitrate 100 MG tablet Take 1 tablet by mouth Daily. 3/3/24 2/29/24 Yes Martina Avilez APRN   allopurinol (ZYLOPRIM) 100 MG tablet Take 1 tablet by mouth Daily.    Provider, MD Lacho   atorvastatin (LIPITOR) 80 MG tablet Take 1 tablet by mouth Daily.     Lacho Vallejo MD   clopidogrel (PLAVIX) 75 MG tablet Take 1 tablet by mouth Daily.    Lacho Vallejo MD   colchicine 0.6 MG tablet Take 1 tablet by mouth Daily.    Lacho Vallejo MD   Continuous Blood Gluc Sensor (FreeStyle Kris 2 Sensor) misc USE AS DIRECTED ONE SENSOR EVERY 14 DAYS 8/18/23   Lacho Vallejo MD   FLUoxetine (PROzac) 10 MG capsule Take 3 capsules by mouth Daily.    Lacho Vallejo MD   gabapentin (NEURONTIN) 300 MG capsule Take 1 capsule by mouth 3 (Three) Times a Day.    Lacho Vallejo MD   glipizide (GLUCOTROL) 5 MG tablet Take 1 tablet by mouth Daily.    Lacho Vallejo MD   levETIRAcetam (KEPPRA) 500 MG tablet Take 1 tablet by mouth 2 (Two) Times a Day.    Lacho Vallejo MD   levothyroxine (SYNTHROID, LEVOTHROID) 75 MCG tablet Take 1 tablet by mouth Daily.    Lacho Vallejo MD   lisinopril (PRINIVIL,ZESTRIL) 20 MG tablet Take 1 tablet by mouth Daily.    Lacho Vallejo MD   omeprazole (priLOSEC) 20 MG capsule Take 1 capsule by mouth Daily.    Lacho Vallejo MD   rivaroxaban (XARELTO) 10 MG tablet Take 1 tablet by mouth Daily With Dinner.    Lacho Vallejo MD       Allergies:  No Known Allergies    Objective     Vitals:   Temp:  [97.6 °F (36.4 °C)-98.4 °F (36.9 °C)] 97.6 °F (36.4 °C)  Heart Rate:  [104-109] 104  Resp:  [18-20] 20  BP: (101-139)/(70-82) 101/71    Intake/Output Summary (Last 24 hours) at 2/29/2024 1616  Last data filed at 2/29/2024 1300  Gross per 24 hour   Intake 720 ml   Output 1725 ml   Net -1005 ml       Physical Exam:   Constitutional: Awake, alert, no acute distress.  HEENT: Sclera anicteric, no conjunctival injection  Neck: Supple, no JVD  Respiratory: Clear to auscultation bilaterally, nonlabored respiration  Cardiovascular: RRR, no rub  Gastrointestinal: Positive bowel sounds, abdomen is soft, nontender and nondistended  Musculoskeletal: No edema, no clubbing or cyanosis  Psychiatric:  Appropriate affect, cooperative  Skin: Warm and dry       Scheduled Meds:     allopurinol, 200 mg, Oral, Daily  atorvastatin, 80 mg, Oral, Nightly  clopidogrel, 75 mg, Oral, Daily  FLUoxetine, 60 mg, Oral, Daily  folic acid, 1 mg, Oral, Daily  gabapentin, 300 mg, Oral, Q8H  glipizide, 5 mg, Oral, QAM AC  insulin aspart, 1-200 Units, Subcutaneous, 4x Daily With Meals & Nightly  insulin detemir, 1-200 Units, Subcutaneous, Nightly - Glucommander  levETIRAcetam, 500 mg, Oral, Q12H  levothyroxine, 75 mcg, Oral, Q AM  lisinopril, 20 mg, Oral, Q24H  metoprolol succinate XL, 25 mg, Oral, Q24H  pantoprazole, 40 mg, Oral, Q AM  rivaroxaban, 10 mg, Oral, Daily With Dinner  thiamine (B-1) IV, 200 mg, Intravenous, Q8H   Followed by  [START ON 3/3/2024] thiamine, 100 mg, Oral, Daily      IV Meds:        Results Reviewed:   I have personally reviewed the results from the time of this admission to 2/29/2024 16:16 EST     Lab Results   Component Value Date    GLUCOSE 68 02/29/2024    CALCIUM 8.9 02/29/2024     02/29/2024    K 4.1 02/29/2024    CO2 22.2 02/29/2024     02/29/2024    BUN 43 (H) 02/29/2024    CREATININE 1.98 (H) 02/29/2024    EGFRIFAFRI >60 11/02/2021    BCR 21.7 02/29/2024    ANIONGAP 14.8 02/29/2024      Lab Results   Component Value Date    MG 2.4 02/28/2024    ALBUMIN 4.2 02/28/2024           Assessment / Plan       CHF (congestive heart failure)    KAREN (acute kidney injury)    Coronary artery disease    Hypertension    Alcohol abuse    Acute exacerbation of CHF (congestive heart failure)      ASSESSMENT:  -Acute kidney injury secondary to contrast-induced nephropathy and diuresis  -Underlying chronic kidney disease stage IIIb.  [Serum creatinine 1.77 on 1/25/2024 and 1.4 on 2/5/2022].  -CHF/cardiomyopathy [EF 35%].  Symptomatically improved.  Diuretics on hold.  -Hypertension.  -Alcohol abuse    PLAN:  -Agree with holding diuretics for now.  -Hold lisinopril  -Recheck labs in a.m.  -Obtain renal  ultrasound.    Thank you for involving us in the care of Sheron Han.  Please feel free to call with any questions.    Ryan Duran MD  02/29/24  16:16 [facility]    [facility]  [phone]      Please note that portions of this note were completed with a voice recognition program.

## 2024-02-29 NOTE — DISCHARGE SUMMARY
"Sheron Han  1945  [id number]    Hospitalists Discharge Summary    Date of Admission: 2/27/2024  Date of Discharge:  3/3/2024    History of Present Illness from Hospitals in [place] on admit:   \"Mr. Han is a pleasant, 77 y/o  male who presents w/ an approximate 4 ay history of worsening exertional dyspnea w/ symptoms of orthopnea and PND over the last 24 hours.  [facility]  He is followed by Cardiology there as well.  He reports that he began noticing some leg swelling last year, and amongst his many medications, he was al started on a \"water pill\".  He report no recent swelling.  He denies chest pain.  He was supposed to accompany his granddaughter to procure her learner's permit today, but he was so dyspneic he came to the ER.  He has had increased stress at home as well which has led to him to return to drinking Vodka and Fireball with his last drink being yesterday.  He denies nausea and vomiting.  He has had no diarrhea.  He denies fever and chills.  He has noted a slight wheeze associated with his dyspnea.\"     Primary Discharge diagnoses:  Acute on chronic HFrEF  Acute alcohol withdrawal    Secondary Discharge Diagnoses:   KAREN  Sinus tachycardia  HTN  Leukocytosis  Hypothyroidism  DM2  CAD/HLD, h/o MI s/p ALBERTINA  Elevated troponin  H/O seizures  Tobacco abuse    Hospital Course Summary:   The patient was followed in consultation by cardiology and nephrology.  He was given several doses of IV Lasix with net output 2 L.  He remained off oxygen throughout.  Echo demonstrated worsening LV function with global hypokinesis and further reduced EF 25 to 35%.  Elevated and rising creatinine prevented further diuresis.  Multiple medication changes were made this admission    Sinus tachycardia was felt secondary to acute alcohol withdrawal and he was managed under CIWA protocol with much improvement.    Patient [facility], recommend follow-up 1 week with PCP, with " Dr. Duran, nephrology in 1-2 weeks, and with cardiology through [facility]    PCP  Patient Care Team:  Provider, No Known as PCP - General    Consults:   Consults       No orders found from 1/29/2024 to 2/28/2024.          Operations and Procedures Performed:     XR Chest 1 View    Result Date: 3/2/2024  Narrative: XR CHEST 1 VW Date of Exam: 3/2/2024 3:25 AM EST Indication: Short of air Comparison: 2/27/2024. Findings: There is stable patchy bilateral perihilar and bibasilar airspace disease, right greater than left. No pneumothorax or pleural effusion. Heart is mildly enlarged. Pulmonary vasculature is largely indistinct. No acute osseous abnormality.     Impression: Impression: Stable patchy bilateral airspace disease, right greater than left. Electronically Signed: Feng Woodward MD  3/2/2024 3:38 AM EST  Workstation ID: [id number]    US Renal Bilateral    Result Date: 3/1/2024  Narrative: US RENAL BILATERAL Date of Exam: 2/29/2024 5:31 PM EST Indication: CKD. Comparison: No comparisons available. Technique: Grayscale and color Doppler ultrasound evaluation of the kidneys and urinary bladder was performed. FINDINGS: The right kidney measures 10.5 x 4.6 x 5.0 cm. The right renal cortical echogenicity is unremarkable. No focal cortical abnormalities are identified. No definitive stones are seen. There is no hydronephrosis. Flow is observed in the right kidney on Doppler evaluation. The left kidney measures 10.9 x 5.8 x 4.5 cm. The left renal cortical echogenicity is unremarkable. 2 anechoic foci with well-defined smooth margins and echogenic through transmission are seen at the lower pole of the left kidney. These findings indicate  the presence of cysts and measure up to 2.8 cm. No definitive stones are seen. There is no hydronephrosis. Flow is observed in the left kidney on Doppler evaluation. Screening evaluation of the bladder is unremarkable.     Impression: 1.Evidence for a cysts at the lower pole of the  left kidney. 2.There is no hydronephrosis bilaterally. Electronically Signed: Robert Reed MD  3/1/2024 7:04 AM EST  Workstation ID: [id number]    Adult Transthoracic Echo Complete W/ Cont if Necessary Per Protocol    Result Date: 2/27/2024  Narrative:   Left ventricular systolic function is severely decreased. Calculated left ventricular EF = 35%; visually estimated LVEF 20-25%.  There is global hypokinesis with regional variations.   The left ventricular cavity is moderately dilated.   RV normal in size and function.   No significant valvular abnormalities.   Normal biatrial and IVC size.     CT Angiogram Chest    Result Date: 2/27/2024  Narrative: CT ANGIOGRAM CHEST Date of Exam: 2/27/2024 11:44 AM EST Indication: Short of air, elevated D-dimer. Comparison: None available. Technique: CTA of the chest was performed after the uneventful intravenous administration of iodinated contrast. Reconstructed coronal and sagittal images were also obtained. In addition, a 3-D volume rendered image was created for interpretation. Automated exposure control and iterative reconstruction methods were used. FINDINGS: No significant focal filling defects are identified to indicate the presence of pulmonary embolism. There are ill-defined multifocal groundglass infiltrates throughout the lungs bilaterally with diffuse interstitial changes. Bilateral pleural effusions are also noted. Somewhat more pronounced infiltrates are noted at the medial aspect of the right upper lobe and within the bilateral lower lobes. The findings suggest developing atypical/viral infection or multifocal pneumonia. Changes secondary to CHF and pulmonary edema may also be considered. Bilateral hilar and mediastinal lymphadenopathy is observed. These changes may be reactive in nature. Aortic atherosclerosis is noted. There is dilatation of the ascending thoracic aorta measuring 4.0 cm indicating an ascending thoracic aortic aneurysm. Severe coronary artery  calcifications are observed. The thyroid gland is unremarkable. The esophagus is unremarkable. The limited evaluation of the upper abdomen demonstrates no evidence for acute abnormality. There is severe age-indeterminate compression fracture deformity of the T12 vertebral body with anterior wedge deformity and biconcave deformity. There is mild retropulsion of the posterior superior endplate fracture fragment. No significant associated malalignment is observed.     Impression: 1.No evidence for pulmonary embolism. 2.Extensive multifocal diffuse airspace infiltrates and diffuse interstitial changes throughout the lungs bilaterally. Associated pleural effusions are observed. The findings suggest changes of potential developing atypical/viral infection or multifocal pneumonia. Changes of CHF and pulmonary edema may also contribute to these findings. Recommend correlation for signs or symptoms of acute infection and follow-up to ensure improvement/resolution. 3.Bilateral hilar and mediastinal lymphadenopathy likely reactive in nature. Recommend follow-up to ensure resolution. 4.Severe coronary artery calcifications are noted. 5.An ascending thoracic aortic aneurysm is noted measuring 4.0 cm in AP dimension. 6.Severe age-indeterminate compression fracture deformity of the T12 vertebral level. There is no significant associated malalignment. Electronically Signed: Robert Reed MD  2/27/2024 12:15 PM EST  Workstation ID: [id number]    XR Chest 1 View    Result Date: 2/27/2024  Narrative: XR CHEST 1 VW Date of Exam: 2/27/2024 10:16 AM EST Indication: Short of air shortness of breath for 2 to 3 days. Worsening this morning. History of smoking. Comparison: None available. FINDINGS: There are ill-defined multifocal airspace infiltrates throughout the lungs bilaterally with associated diffuse interstitial changes. No pleural effusions are observed. The cardiac silhouette and mediastinum are unremarkable. No acute osseous  abnormalities are identified.     Impression: 1.Ill-defined multifocal airspace infiltrates bilaterally with diffuse interstitial changes. The findings suggest developing atypical/viral infection or multifocal pneumonia. Changes of COVID-19 infection may be included in the differential. Changes of CHF and pulmonary edema may also be considered. Recommend correlation for signs or symptoms of acute infection and follow-up to ensure improvement/resolution. Electronically Signed: Robert Reed MD  2/27/2024 10:35 AM EST  Workstation ID: [id number]     Allergies:  has No Known Allergies.    Joao  Hydrocodone 2/2024 per report, reviewed by me    Discharge Medications:     Discharge Medications        New Medications        Instructions Start Date   empagliflozin 10 MG tablet tablet  Commonly known as: JARDIANCE   10 mg, Oral, Daily      folic acid 1 MG tablet  Commonly known as: FOLVITE   1 mg, Oral, Daily      metoprolol succinate XL 25 MG 24 hr tablet  Commonly known as: TOPROL-XL   25 mg, Oral, Every 24 Hours Scheduled      Thiamine Mononitrate 100 MG tablet   100 mg, Oral, Daily             Changes to Medications        Instructions Start Date   Allopurinol 200 MG tablet  What changed:   medication strength  how much to take   Take 1 tablet by mouth Daily.      FLUoxetine 20 MG capsule  Commonly known as: PROzac  What changed:   medication strength  how much to take   60 mg, Oral, Daily      lisinopril 20 MG tablet  Commonly known as: PRINIVIL,ZESTRIL  What changed: how much to take   10 mg, Oral, Daily             Continue These Medications        Instructions Start Date   atorvastatin 80 MG tablet  Commonly known as: LIPITOR   80 mg, Oral, Daily      clopidogrel 75 MG tablet  Commonly known as: PLAVIX   75 mg, Oral, Daily      FreeStyle Kris 2 Sensor misc  Notes to patient: As directed   USE AS DIRECTED ONE SENSOR EVERY 14 DAYS      gabapentin 300 MG capsule  Commonly known as: NEURONTIN   300 mg, Oral, 3 Times  "Daily      glipizide 5 MG tablet  Commonly known as: GLUCOTROL   5 mg, Oral, Daily      HYDROcodone-acetaminophen  MG per tablet  Commonly known as: NORCO   1 tablet, Oral, Every 6 Hours PRN      levETIRAcetam 500 MG tablet  Commonly known as: KEPPRA   500 mg, Oral, 2 Times Daily      levothyroxine 75 MCG tablet  Commonly known as: SYNTHROID, LEVOTHROID   75 mcg, Oral, Daily      rivaroxaban 10 MG tablet  Commonly known as: XARELTO   10 mg, Oral, Daily With Dinner             Stop These Medications      colchicine 0.6 MG tablet     omeprazole 20 MG capsule  Commonly known as: priLOSEC              Last Lab Results:   Lab Results (most recent)       Procedure Component Value Units Date/Time    POC Glucose Once [[id number]]  (Abnormal) Collected: 02/28/24 2157    Specimen: Blood Updated: 02/28/24 2203     Glucose 154 mg/dL     POC Glucose Once [[id number]]  (Abnormal) Collected: 02/28/24 1706    Specimen: Blood Updated: 02/28/24 1712     Glucose 139 mg/dL     Procalcitonin [[id number]]  (Normal) Collected: 02/28/24 0447    Specimen: Blood Updated: 02/28/24 1531     Procalcitonin 0.07 ng/mL     Narrative:      As a Marker for Sepsis (Non-Neonates):    1. <0.5 ng/mL represents a low risk of severe sepsis and/or septic shock.  2. >2 ng/mL represents a high risk of severe sepsis and/or septic shock.    As a Marker for Lower Respiratory Tract Infections that require antibiotic therapy:    PCT on Admission    Antibiotic Therapy       6-12 Hrs later    >0.5                Strongly Recommended  >0.25 - <0.5        Recommended   0.1 - 0.25          Discouraged              Remeasure/reassess PCT  <0.1                Strongly Discouraged     Remeasure/reassess PCT    As 28 day mortality risk marker: \"Change in Procalcitonin Result\" (>80% or <=80%) if Day 0 (or Day 1) and Day 4 values are available. Refer to http://www.[facility]-pct-calculator.com    Change in PCT <=80%  A decrease of PCT levels below or equal to 80% defines a " positive change in PCT test result representing a higher risk for 28-day all-cause mortality of patients diagnosed with severe sepsis for septic shock.    Change in PCT >80%  A decrease of PCT levels of more than 80% defines a negative change in PCT result representing a lower risk for 28-day all-cause mortality of patients diagnosed with severe sepsis or septic shock.       Magnesium [[id number]]  (Normal) Collected: 02/28/24 0447    Specimen: Blood Updated: 02/28/24 1524     Magnesium 2.4 mg/dL     Blood Culture - Blood, Arm, Right [[id number]]  (Normal) Collected: 02/27/24 1106    Specimen: Blood from Arm, Right Updated: 02/28/24 1145     Blood Culture No growth at 24 hours    Blood Culture - Blood, Arm, Left [[id number]]  (Normal) Collected: 02/27/24 1056    Specimen: Blood from Arm, Left Updated: 02/28/24 1145     Blood Culture No growth at 24 hours    Beta Strep Culture, Throat - Swab, Throat [[id number]]  (Normal) Collected: 02/27/24 1001    Specimen: Swab from Throat Updated: 02/28/24 1144     Throat Culture, Beta Strep No Beta Hemolytic Streptococcus Isolated    Narrative:      Group A Strep incidence is low in adults. Positive culture for Beta hemolytic Streptococcus species can reflect colonization and not true infection. Please correlate clinically.    Comprehensive Metabolic Panel [[id number]]  (Abnormal) Collected: 02/28/24 0447    Specimen: Blood Updated: 02/28/24 0611     Glucose 213 mg/dL      BUN 30 mg/dL      Creatinine 1.88 mg/dL      Sodium 139 mmol/L      Potassium 4.0 mmol/L      Chloride 101 mmol/L      CO2 20.8 mmol/L      Calcium 9.5 mg/dL      Total Protein 6.9 g/dL      Albumin 4.2 g/dL      ALT (SGPT) 14 U/L      AST (SGOT) 15 U/L      Alkaline Phosphatase 89 U/L      Total Bilirubin 0.4 mg/dL      Globulin 2.7 gm/dL      A/G Ratio 1.6 g/dL      BUN/Creatinine Ratio 16.0     Anion Gap 17.2 mmol/L      eGFR 36.1 mL/min/1.73     Narrative:      GFR Normal >60  Chronic Kidney Disease <60  Kidney  Failure <15    The GFR formula is only valid for adults with stable renal function between ages 18 and 70.    CBC & Differential [[id number]]  (Abnormal) Collected: 02/28/24 0447    Specimen: Blood Updated: 02/28/24 0605    Narrative:      The following orders were created for panel order CBC & Differential.  Procedure                               Abnormality         Status                     ---------                               -----------         ------                     CBC Auto Differential[[id number]]        Abnormal            Final result               Scan Slide[[id number]]                                                                    Please view results for these tests on the individual orders.    CBC Auto Differential [[id number]]  (Abnormal) Collected: 02/28/24 0447    Specimen: Blood Updated: 02/28/24 0558     WBC 14.15 10*3/mm3      RBC 6.76 10*6/mm3      Hemoglobin 15.4 g/dL      Hematocrit 49.5 %      MCV 73.2 fL      MCH 22.8 pg      MCHC 31.1 g/dL      RDW 20.6 %      RDW-SD 47.8 fl      MPV 11.6 fL      Platelets 446 10*3/mm3      Neutrophil % 85.7 %      Lymphocyte % 5.7 %      Monocyte % 6.8 %      Eosinophil % 0.1 %      Basophil % 0.4 %      Immature Grans % 1.3 %      Neutrophils, Absolute 12.14 10*3/mm3      Lymphocytes, Absolute 0.81 10*3/mm3      Monocytes, Absolute 0.96 10*3/mm3      Eosinophils, Absolute 0.01 10*3/mm3      Basophils, Absolute 0.05 10*3/mm3      Immature Grans, Absolute 0.18 10*3/mm3      nRBC 0.0 /100 WBC     TSH [[id number]]  (Normal) Collected: 02/27/24 1136    Specimen: Blood from Arm, Right Updated: 02/27/24 1811     TSH 1.770 uIU/mL     Hemoglobin A1c [[id number]]  (Abnormal) Collected: 02/27/24 0942    Specimen: Blood Updated: 02/27/24 1806     Hemoglobin A1C 6.10 %     Narrative:      Hemoglobin A1C Ranges:    Increased Risk for Diabetes  5.7% to 6.4%  Diabetes                     >= 6.5%  Diabetic Goal                < 7.0%    Respiratory Panel PCR  w/COVID-19(SARS-CoV-2) BLANCO/JUAN/NAHEED/PAD/COR/CHRISTINE In-House, NP Swab in UTM/VTM, 2 HR TAT - Swab, Nasopharynx [[id number]]  (Normal) Collected: 02/27/24 1050    Specimen: Swab from Nasopharynx Updated: 02/27/24 1658     ADENOVIRUS, PCR Not Detected     Coronavirus 229E Not Detected     Coronavirus HKU1 Not Detected     Coronavirus NL63 Not Detected     Coronavirus OC43 Not Detected     COVID19 Not Detected     Human Metapneumovirus Not Detected     Human Rhinovirus/Enterovirus Not Detected     Influenza A PCR Not Detected     Influenza B PCR Not Detected     Parainfluenza Virus 1 Not Detected     Parainfluenza Virus 2 Not Detected     Parainfluenza Virus 3 Not Detected     Parainfluenza Virus 4 Not Detected     RSV, PCR Not Detected     Bordetella pertussis pcr Not Detected     Bordetella parapertussis PCR Not Detected     Chlamydophila pneumoniae PCR Not Detected     Mycoplasma pneumo by PCR Not Detected    Narrative:      In the setting of a positive respiratory panel with a viral infection PLUS a negative procalcitonin without other underlying concern for bacterial infection, consider observing off antibiotics or discontinuation of antibiotics and continue supportive care. If the respiratory panel is positive for atypical bacterial infection (Bordetella pertussis, Chlamydophila pneumoniae, or Mycoplasma pneumoniae), consider antibiotic de-escalation to target atypical bacterial infection.    Magnesium [[id number]]  (Abnormal) Collected: 02/27/24 1136    Specimen: Blood from Arm, Right Updated: 02/27/24 1623     Magnesium 1.1 mg/dL     Ethanol [[id number]] Collected: 02/27/24 1136    Specimen: Blood from Arm, Right Updated: 02/27/24 1537     Ethanol <10 mg/dL      Ethanol % <0.010 %     Lipid Panel [[id number]]  (Abnormal) Collected: 02/27/24 1136    Specimen: Blood from Arm, Right Updated: 02/27/24 1537     Total Cholesterol 241 mg/dL      Triglycerides 154 mg/dL      HDL Cholesterol 42 mg/dL      LDL Cholesterol  171  mg/dL      VLDL Cholesterol 28 mg/dL      LDL/HDL Ratio 4.00    Narrative:      Cholesterol Reference Ranges  (U.S. Department of Health and Human Services ATP III Classifications)    Desirable          <200 mg/dL  Borderline High    200-239 mg/dL  High Risk          >240 mg/dL      Triglyceride Reference Ranges  (U.S. Department of Health and Human Services ATP III Classifications)    Normal           <150 mg/dL  Borderline High  150-199 mg/dL  High             200-499 mg/dL  Very High        >500 mg/dL    HDL Reference Ranges  (U.S. Department of Health and Human Services ATP III Classifications)    Low     <40 mg/dl (major risk factor for CHD)  High    >60 mg/dl ('negative' risk factor for CHD)        LDL Reference Ranges  (U.S. Department of Health and Human Services ATP III Classifications)    Optimal          <100 mg/dL  Near Optimal     100-129 mg/dL  Borderline High  130-159 mg/dL  High             160-189 mg/dL  Very High        >189 mg/dL    High Sensitivity Troponin T 2Hr [[id number]]  (Abnormal) Collected: 02/27/24 1136    Specimen: Blood from Arm, Right Updated: 02/27/24 1207     HS Troponin T 54 ng/L      Troponin T Delta -11 ng/L     Narrative:      High Sensitive Troponin T Reference Range:  <14.0 ng/L- Negative Female for AMI  <22.0 ng/L- Negative Male for AMI  >=14 - Abnormal Female indicating possible myocardial injury.  >=22 - Abnormal Male indicating possible myocardial injury.   Clinicians would have to utilize clinical acumen, EKG, Troponin, and serial changes to determine if it is an Acute Myocardial Infarction or myocardial injury due to an underlying chronic condition.         Lactic Acid, Plasma [[id number]]  (Normal) Collected: 02/27/24 1056    Specimen: Blood from Arm, Left Updated: 02/27/24 1147     Lactate 1.9 mmol/L     Procalcitonin [[id number]]  (Normal) Collected: 02/27/24 0942    Specimen: Blood Updated: 02/27/24 1118     Procalcitonin 0.05 ng/mL     Narrative:      As a Marker for  "Sepsis (Non-Neonates):    1. <0.5 ng/mL represents a low risk of severe sepsis and/or septic shock.  2. >2 ng/mL represents a high risk of severe sepsis and/or septic shock.    As a Marker for Lower Respiratory Tract Infections that require antibiotic therapy:    PCT on Admission    Antibiotic Therapy       6-12 Hrs later    >0.5                Strongly Recommended  >0.25 - <0.5        Recommended   0.1 - 0.25          Discouraged              Remeasure/reassess PCT  <0.1                Strongly Discouraged     Remeasure/reassess PCT    As 28 day mortality risk marker: \"Change in Procalcitonin Result\" (>80% or <=80%) if Day 0 (or Day 1) and Day 4 values are available. Refer to http://www.[employer]ct-calculator.com    Change in PCT <=80%  A decrease of PCT levels below or equal to 80% defines a positive change in PCT test result representing a higher risk for 28-day all-cause mortality of patients diagnosed with severe sepsis for septic shock.    Change in PCT >80%  A decrease of PCT levels of more than 80% defines a negative change in PCT result representing a lower risk for 28-day all-cause mortality of patients diagnosed with severe sepsis or septic shock.       BNP [[id number]]  (Abnormal) Collected: 02/27/24 0942    Specimen: Blood Updated: 02/27/24 1106     proBNP 13,365.0 pg/mL     Narrative:      This assay is used as an aid in the diagnosis of individuals suspected of having heart failure. It can be used as an aid in the diagnosis of acute decompensated heart failure (ADHF) in patients presenting with signs and symptoms of ADHF to the emergency department (ED). In addition, NT-proBNP of <300 pg/mL indicates ADHF is not likely.    Age Range Result Interpretation  NT-proBNP Concentration (pg/mL:      <50             Positive            >450                   Gray                 300-450                    Negative             <300    50-75           Positive            >900                  Lainez                " 300-900                  Negative            <300      >75             Positive            >1800                  Gray                300-1800                  Negative            <300    Rapid Strep A Screen - Swab, Throat [[id number]]  (Normal) Collected: 02/27/24 1001    Specimen: Swab from Throat Updated: 02/27/24 1047     Strep A Ag Negative    High Sensitivity Troponin T [[id number]]  (Abnormal) Collected: 02/27/24 0942    Specimen: Blood Updated: 02/27/24 1032     HS Troponin T 65 ng/L     Narrative:      High Sensitive Troponin T Reference Range:  <14.0 ng/L- Negative Female for AMI  <22.0 ng/L- Negative Male for AMI  >=14 - Abnormal Female indicating possible myocardial injury.  >=22 - Abnormal Male indicating possible myocardial injury.   Clinicians would have to utilize clinical acumen, EKG, Troponin, and serial changes to determine if it is an Acute Myocardial Infarction or myocardial injury due to an underlying chronic condition.         CBC & Differential [[id number]]  (Abnormal) Collected: 02/27/24 0942    Specimen: Blood Updated: 02/27/24 1031    Narrative:      The following orders were created for panel order CBC & Differential.  Procedure                               Abnormality         Status                     ---------                               -----------         ------                     CBC Auto Differential[[id number]]        Abnormal            Final result                 Please view results for these tests on the individual orders.    CBC Auto Differential [[id number]]  (Abnormal) Collected: 02/27/24 0942    Specimen: Blood Updated: 02/27/24 1031     WBC 13.28 10*3/mm3      RBC 6.80 10*6/mm3      Hemoglobin 15.4 g/dL      Hematocrit 51.8 %      MCV 76.2 fL      MCH 22.6 pg      MCHC 29.7 g/dL      RDW 20.6 %      RDW-SD 50.5 fl      MPV 11.3 fL      Platelets 383 10*3/mm3      Neutrophil % 84.3 %      Lymphocyte % 7.0 %      Monocyte % 6.0 %      Eosinophil % 0.7 %      Basophil % 0.9  %      Immature Grans % 1.1 %      Neutrophils, Absolute 11.20 10*3/mm3      Lymphocytes, Absolute 0.93 10*3/mm3      Monocytes, Absolute 0.80 10*3/mm3      Eosinophils, Absolute 0.09 10*3/mm3      Basophils, Absolute 0.12 10*3/mm3      Immature Grans, Absolute 0.14 10*3/mm3      nRBC 0.0 /100 WBC     COVID-19 and FLU A/B PCR, 1 HR TAT - Swab, Nasopharynx [[id number]]  (Normal) Collected: 02/27/24 0942    Specimen: Swab from Nasopharynx Updated: 02/27/24 1017     COVID19 Not Detected     Influenza A PCR Not Detected     Influenza B PCR Not Detected    Narrative:      Fact sheet for providers: https://www.fda.gov/media/103786/download    Fact sheet for patients: https://www.fda.gov/media/193980/download    Test performed by PCR.    Comprehensive Metabolic Panel [[id number]]  (Abnormal) Collected: 02/27/24 0942    Specimen: Blood Updated: 02/27/24 1015     Glucose 158 mg/dL      BUN 21 mg/dL      Creatinine 1.65 mg/dL      Sodium 139 mmol/L      Potassium 3.8 mmol/L      Chloride 100 mmol/L      CO2 23.1 mmol/L      Calcium 9.4 mg/dL      Total Protein 7.2 g/dL      Albumin 4.4 g/dL      ALT (SGPT) 13 U/L      AST (SGOT) 14 U/L      Alkaline Phosphatase 90 U/L      Total Bilirubin 0.6 mg/dL      Globulin 2.8 gm/dL      A/G Ratio 1.6 g/dL      BUN/Creatinine Ratio 12.7     Anion Gap 15.9 mmol/L      eGFR 42.2 mL/min/1.73     Narrative:      GFR Normal >60  Chronic Kidney Disease <60  Kidney Failure <15    The GFR formula is only valid for adults with stable renal function between ages 18 and 70.    D-dimer, Quantitative [[id number]]  (Abnormal) Collected: 02/27/24 0942    Specimen: Blood Updated: 02/27/24 1013     D-Dimer, Quantitative 1.86 MCGFEU/mL     Narrative:      According to the assay 's published package insert, a normal (<0.50 MCGFEU/mL) D-dimer result in conjunction with a non-high clinical probability assessment, excludes deep vein thrombosis (DVT) and pulmonary embolism (PE) with high  "sensitivity.    D-dimer values increase with age and this can make VTE exclusion of an older population difficult. To address this, the [employer], based on best available evidence and recent guidelines, recommends that clinicians use age-adjusted D-dimer thresholds in patients greater than 50 years of age with: a) a low probability of PE who do not meet all Pulmonary Embolism Rule Out Criteria, or b) in those with intermediate probability of PE.   The formula for an age-adjusted D-dimer cut-off is \"age/100\".  For example, a 60 year old patient would have an age-adjusted cut-off of 0.60 MCGFEU/mL and an 80 year old 0.80 MCGFEU/mL.          Imaging Results (Most Recent)       Procedure Component Value Units Date/Time    US Renal Bilateral [[id number]] Collected: 03/01/24 0700     Updated: 03/01/24 0707    Narrative:      US RENAL BILATERAL    Date of Exam: 2/29/2024 5:31 PM EST    Indication: CKD.    Comparison: No comparisons available.    Technique: Grayscale and color Doppler ultrasound evaluation of the kidneys and urinary bladder was performed.      FINDINGS:  The right kidney measures 10.5 x 4.6 x 5.0 cm. The right renal cortical echogenicity is unremarkable. No focal cortical abnormalities are identified. No definitive stones are seen. There is no hydronephrosis. Flow is observed in the right kidney on   Doppler evaluation.    The left kidney measures 10.9 x 5.8 x 4.5 cm. The left renal cortical echogenicity is unremarkable. 2 anechoic foci with well-defined smooth margins and echogenic through transmission are seen at the lower pole of the left kidney. These findings indicate   the presence of cysts and measure up to 2.8 cm. No definitive stones are seen. There is no hydronephrosis. Flow is observed in the left kidney on Doppler evaluation.    Screening evaluation of the bladder is unremarkable.      Impression:      1.Evidence for a cysts at the lower pole of the left kidney.  2.There is " no hydronephrosis bilaterally.      Electronically Signed: Robert Reed MD    3/1/2024 7:04 AM EST    Workstation ID: [id number]    CT Angiogram Chest [[id number]] Collected: 02/27/24 1200     Updated: 02/27/24 1221    Narrative:      CT ANGIOGRAM CHEST    Date of Exam: 2/27/2024 11:44 AM EST    Indication: Short of air, elevated D-dimer.    Comparison: None available.    Technique: CTA of the chest was performed after the uneventful intravenous administration of iodinated contrast. Reconstructed coronal and sagittal images were also obtained. In addition, a 3-D volume rendered image was created for interpretation.   Automated exposure control and iterative reconstruction methods were used.    FINDINGS:  No significant focal filling defects are identified to indicate the presence of pulmonary embolism.    There are ill-defined multifocal groundglass infiltrates throughout the lungs bilaterally with diffuse interstitial changes. Bilateral pleural effusions are also noted. Somewhat more pronounced infiltrates are noted at the medial aspect of the right   upper lobe and within the bilateral lower lobes. The findings suggest developing atypical/viral infection or multifocal pneumonia. Changes secondary to CHF and pulmonary edema may also be considered.    Bilateral hilar and mediastinal lymphadenopathy is observed. These changes may be reactive in nature. Aortic atherosclerosis is noted. There is dilatation of the ascending thoracic aorta measuring 4.0 cm indicating an ascending thoracic aortic aneurysm.   Severe coronary artery calcifications are observed. The thyroid gland is unremarkable. The esophagus is unremarkable.    The limited evaluation of the upper abdomen demonstrates no evidence for acute abnormality.    There is severe age-indeterminate compression fracture deformity of the T12 vertebral body with anterior wedge deformity and biconcave deformity. There is mild retropulsion of the posterior superior  endplate fracture fragment. No significant associated   malalignment is observed.      Impression:      1.No evidence for pulmonary embolism.  2.Extensive multifocal diffuse airspace infiltrates and diffuse interstitial changes throughout the lungs bilaterally. Associated pleural effusions are observed. The findings suggest changes of potential developing atypical/viral infection or multifocal   pneumonia. Changes of CHF and pulmonary edema may also contribute to these findings. Recommend correlation for signs or symptoms of acute infection and follow-up to ensure improvement/resolution.  3.Bilateral hilar and mediastinal lymphadenopathy likely reactive in nature. Recommend follow-up to ensure resolution.  4.Severe coronary artery calcifications are noted.  5.An ascending thoracic aortic aneurysm is noted measuring 4.0 cm in AP dimension.  6.Severe age-indeterminate compression fracture deformity of the T12 vertebral level. There is no significant associated malalignment.        Electronically Signed: Robert Reed MD    2/27/2024 12:15 PM EST    Workstation ID: [id number]    XR Chest 1 View [[id number]] Collected: 02/27/24 1033     Updated: 02/27/24 1045    Narrative:      XR CHEST 1 VW    Date of Exam: 2/27/2024 10:16 AM EST    Indication: Short of air shortness of breath for 2 to 3 days. Worsening this morning. History of smoking.    Comparison: None available.    FINDINGS:  There are ill-defined multifocal airspace infiltrates throughout the lungs bilaterally with associated diffuse interstitial changes. No pleural effusions are observed. The cardiac silhouette and mediastinum are unremarkable. No acute osseous   abnormalities are identified.      Impression:      1.Ill-defined multifocal airspace infiltrates bilaterally with diffuse interstitial changes. The findings suggest developing atypical/viral infection or multifocal pneumonia. Changes of COVID-19 infection may be included in the differential. Changes of  "  CHF and pulmonary edema may also be considered. Recommend correlation for signs or symptoms of acute infection and follow-up to ensure improvement/resolution.        Electronically Signed: Robert Reed MD    2/27/2024 10:35 AM EST    Workstation ID: [id number]          PROCEDURES: None    Condition on Discharge: Stable    Physical Exam at Discharge  Vital Signs     Body mass index is 26.69 kg/m².    Physical Exam      Discharge Disposition  Home    Visiting Nurse:    No     Home PT/OT:  No     Home Safety Evaluation:  No     DME  N/a    Discharge Diet:    Heart-healthy diabetic diet    Activity at Discharge:  As tolerated    Pre-discharge education  Diabetic, Smoking, Cardiac, medications, follow-up    Follow-up Appointments  Future Appointments   Date Time Provider Department Center   3/4/2024  2:00 PM Zion Brizuela PA-C [facility]     Additional Instructions for the Follow-ups that You Need to Schedule       Discharge Follow-up with PCP   As directed       Currently Documented PCP:    Provider, No Known    PCP Phone Number:    [phone]     Follow Up Details: [facility] week, [facility]        [facility]charge Follow-up with Specified Provider: [facility]; 1 Week   As directed      To: [facility]   Follow Up: 1 Week        Discharge Follow-up with Specified Provider: nephrology; 1 Week   As directed      To: nephrology   Follow Up: 1 Week                Test Results Pending at Discharge: To be followed up by PCP     Dewayne Jenkins MD  03/03/24  12:17 EST    Time: Discharge over 30 min secondary to:   -Coordination of care/follow up  -Medication reconciliation  -D/W patient     \"Dictated utilizing Dragon dictation\"      "

## 2024-02-29 NOTE — PROGRESS NOTES
"Hospitalist Team      Patient Care Team:  Provider, No Known as PCP - General      Chief Complaint:  Follow-up Acute-on-Chronic CHF; Renal Dysfunction    Subjective    Feels well and would like to go home.  Mr. Han denies chest pain and dyspnea.  He continues to tolerate PO.  He is ambulating w/o difficulty.    Objective    Vital Signs  Temp:  [97.4 °F (36.3 °C)-98.4 °F (36.9 °C)] 97.9 °F (36.6 °C)  Heart Rate:  [104-109] 109  Resp:  [18] 18  BP: (100-139)/(65-82) 108/70  Oxygen Therapy  SpO2: 95 %  Pulse Oximetry Type: Continuous  Device (Oxygen Therapy): room air}    Flowsheet Rows      Flowsheet Row First Filed Value   Admission Height 180.3 cm (71\") Documented at 02/27/2024 0937   Admission Weight 90.7 kg (200 lb) Documented at 02/27/2024 0937            Physical Exam:    General: Appears stated age in no acute distress.  Lungs: Breath sounds are diminished throughout all fields.  Respirations are non-labored.  CV: Irregular rate and rhythm.  No murmur appreciated.  S1 and S2 normal.  Radial pulses are 2+ and symmetric.  Abdomen: Soft and non-tender w/ active bowel sounds.  MSK: No C/C/E.  Neuro: CN II-XII grossly intact.  Psych: Normal affect.  Ox3.    Results Review:     I reviewed the patient's new clinical results.    Lab Results (last 24 hours)       Procedure Component Value Units Date/Time    Basic Metabolic Panel [[id number]]  (Abnormal) Collected: 02/29/24 1220    Specimen: Blood Updated: 02/29/24 1246     Glucose 68 mg/dL      BUN 43 mg/dL      Creatinine 1.98 mg/dL      Sodium 141 mmol/L      Potassium 4.1 mmol/L      Chloride 104 mmol/L      CO2 22.2 mmol/L      Calcium 8.9 mg/dL      BUN/Creatinine Ratio 21.7     Anion Gap 14.8 mmol/L      eGFR 33.9 mL/min/1.73     Narrative:      GFR Normal >60  Chronic Kidney Disease <60  Kidney Failure <15    The GFR formula is only valid for adults with stable renal function between ages 18 and 70.    POC Glucose Once [[id number]]  (Normal) Collected: 02/29/24 " 1231    Specimen: Blood Updated: 02/29/24 1238     Glucose 70 mg/dL     Blood Culture - Blood, Arm, Right [[id number]]  (Normal) Collected: 02/27/24 1106    Specimen: Blood from Arm, Right Updated: 02/29/24 1145     Blood Culture No growth at 2 days    Blood Culture - Blood, Arm, Left [[id number]]  (Normal) Collected: 02/27/24 1056    Specimen: Blood from Arm, Left Updated: 02/29/24 1145     Blood Culture No growth at 2 days    Beta Strep Culture, Throat - Swab, Throat [[id number]]  (Normal) Collected: 02/27/24 1001    Specimen: Swab from Throat Updated: 02/29/24 0936     Throat Culture, Beta Strep No Beta Hemolytic Streptococcus Isolated    Narrative:      Group A Strep incidence is low in adults. Positive culture for Beta hemolytic Streptococcus species can reflect colonization and not true infection. Please correlate clinically.    POC Glucose Once [[id number]]  (Abnormal) Collected: 02/29/24 0839    Specimen: Blood Updated: 02/29/24 0845     Glucose 146 mg/dL     CBC & Differential [[id number]]  (Abnormal) Collected: 02/29/24 0412    Specimen: Blood Updated: 02/29/24 0549    Narrative:      The following orders were created for panel order CBC & Differential.  Procedure                               Abnormality         Status                     ---------                               -----------         ------                     CBC Auto Differential[[id number]]        Abnormal            Final result               Scan Slide[[id number]]                                       Final result                 Please view results for these tests on the individual orders.    Scan Slide [[id number]] Collected: 02/29/24 0412    Specimen: Blood Updated: 02/29/24 0549     RBC Morphology Normal     WBC Morphology Normal     Platelet Estimate Adequate    Basic Metabolic Panel [[id number]]  (Abnormal) Collected: 02/29/24 0412    Specimen: Blood Updated: 02/29/24 0458     Glucose 146 mg/dL      BUN 43 mg/dL      Creatinine 1.96 mg/dL       Sodium 139 mmol/L      Potassium 4.0 mmol/L      Chloride 103 mmol/L      CO2 22.2 mmol/L      Calcium 9.1 mg/dL      BUN/Creatinine Ratio 21.9     Anion Gap 13.8 mmol/L      eGFR 34.4 mL/min/1.73     Narrative:      GFR Normal >60  Chronic Kidney Disease <60  Kidney Failure <15    The GFR formula is only valid for adults with stable renal function between ages 18 and 70.    CBC Auto Differential [[id number]]  (Abnormal) Collected: 02/29/24 0412    Specimen: Blood Updated: 02/29/24 0450     WBC 13.84 10*3/mm3      RBC 6.55 10*6/mm3      Hemoglobin 14.9 g/dL      Hematocrit 48.6 %      MCV 74.2 fL      MCH 22.7 pg      MCHC 30.7 g/dL      RDW 20.4 %      RDW-SD 49.5 fl      MPV 11.7 fL      Platelets 369 10*3/mm3      Neutrophil % 80.5 %      Lymphocyte % 10.5 %      Monocyte % 5.4 %      Eosinophil % 1.3 %      Basophil % 0.9 %      Immature Grans % 1.4 %      Neutrophils, Absolute 11.13 10*3/mm3      Lymphocytes, Absolute 1.46 10*3/mm3      Monocytes, Absolute 0.75 10*3/mm3      Eosinophils, Absolute 0.18 10*3/mm3      Basophils, Absolute 0.12 10*3/mm3      Immature Grans, Absolute 0.20 10*3/mm3      nRBC 0.0 /100 WBC     POC Glucose Once [[id number]]  (Abnormal) Collected: 02/28/24 2157    Specimen: Blood Updated: 02/28/24 2203     Glucose 154 mg/dL     POC Glucose Once [[id number]]  (Abnormal) Collected: 02/28/24 1706    Specimen: Blood Updated: 02/28/24 1712     Glucose 139 mg/dL     Procalcitonin [[id number]]  (Normal) Collected: 02/28/24 0447    Specimen: Blood Updated: 02/28/24 1531     Procalcitonin 0.07 ng/mL     Narrative:      As a Marker for Sepsis (Non-Neonates):    1. <0.5 ng/mL represents a low risk of severe sepsis and/or septic shock.  2. >2 ng/mL represents a high risk of severe sepsis and/or septic shock.    As a Marker for Lower Respiratory Tract Infections that require antibiotic therapy:    PCT on Admission    Antibiotic Therapy       6-12 Hrs later    >0.5                Strongly  "Recommended  >0.25 - <0.5        Recommended   0.1 - 0.25          Discouraged              Remeasure/reassess PCT  <0.1                Strongly Discouraged     Remeasure/reassess PCT    As 28 day mortality risk marker: \"Change in Procalcitonin Result\" (>80% or <=80%) if Day 0 (or Day 1) and Day 4 values are available. Refer to http://www.[facility]-pct-calculator.com    Change in PCT <=80%  A decrease of PCT levels below or equal to 80% defines a positive change in PCT test result representing a higher risk for 28-day all-cause mortality of patients diagnosed with severe sepsis for septic shock.    Change in PCT >80%  A decrease of PCT levels of more than 80% defines a negative change in PCT result representing a lower risk for 28-day all-cause mortality of patients diagnosed with severe sepsis or septic shock.       Magnesium [[id number]]  (Normal) Collected: 02/28/24 0447    Specimen: Blood Updated: 02/28/24 1524     Magnesium 2.4 mg/dL             Imaging Results (Last 24 Hours)       ** No results found for the last 24 hours. **              Medication Review:   I have reviewed the patient's current medication list    Current Facility-Administered Medications:     allopurinol (ZYLOPRIM) tablet 200 mg, 200 mg, Oral, Daily, Anand Vieira MD, 200 mg at 02/29/24 0848    atorvastatin (LIPITOR) tablet 80 mg, 80 mg, Oral, Nightly, Anand Vieira MD, 80 mg at 02/28/24 2102    clopidogrel (PLAVIX) tablet 75 mg, 75 mg, Oral, Daily, Anand Vieira MD, 75 mg at 02/29/24 0849    dextrose (D50W) (25 g/50 mL) IV injection 10-50 mL, 10-50 mL, Intravenous, Q15 Min PRN, Anand Vieira MD    dextrose (GLUTOSE) oral gel 15 g, 15 g, Oral, Q15 Min PRN, Anand Vieira MD    empagliflozin (JARDIANCE) tablet 10 mg, 10 mg, Oral, Daily, Barrera Guadalupe PA-C, 10 mg at 02/29/24 0849    FLUoxetine (PROzac) capsule 60 mg, 60 mg, Oral, Daily, Anand Vieira MD, 60 mg at 02/29/24 0848    folic acid (FOLVITE) tablet 1 mg, 1 mg, Oral, " Daily, Anand Vieira MD, 1 mg at 02/29/24 0849    gabapentin (NEURONTIN) capsule 300 mg, 300 mg, Oral, Q8H, Anand Vieira MD, 300 mg at 02/29/24 0632    glipizide (GLUCOTROL) tablet 5 mg, 5 mg, Oral, QAM AC, Anand Vieira MD, 5 mg at 02/29/24 0849    glucagon (GLUCAGEN) injection 1 mg, 1 mg, Intramuscular, Q15 Min PRN, Anand Vieira MD    HYDROcodone-acetaminophen (NORCO)  MG per tablet 1 tablet, 1 tablet, Oral, Q6H PRN, Anand Vieira MD, 1 tablet at 02/29/24 0639    insulin aspart (novoLOG) injection 1-200 Units, 1-200 Units, Subcutaneous, 4x Daily With Meals & Nightly, Anand Vieira MD, 4 Units at 02/29/24 1245    insulin aspart (novoLOG) injection 1-200 Units, 1-200 Units, Subcutaneous, PRN, Anand Vieira MD    insulin detemir (LEVEMIR) injection 1-200 Units, 1-200 Units, Subcutaneous, Nightly - Glucommander, Anand Vieira MD, 23 Units at 02/28/24 2210    levETIRAcetam (KEPPRA) tablet 500 mg, 500 mg, Oral, Q12H, Anand Vieira MD, 500 mg at 02/29/24 0849    levothyroxine (SYNTHROID, LEVOTHROID) tablet 75 mcg, 75 mcg, Oral, Q AM, Anand Vieira MD, 75 mcg at 02/29/24 0632    lisinopril (PRINIVIL,ZESTRIL) tablet 20 mg, 20 mg, Oral, Q24H, Anand Vieira MD, 20 mg at 02/29/24 0848    LORazepam (ATIVAN) tablet 1 mg, 1 mg, Oral, Q1H PRN **OR** Midazolam HCl (PF) (VERSED) injection 2 mg, 2 mg, Intravenous, Q1H PRN **OR** LORazepam (ATIVAN) tablet 2 mg, 2 mg, Oral, Q1H PRN **OR** Midazolam HCl (PF) (VERSED) injection 4 mg, 4 mg, Intravenous, Q1H PRN **OR** Midazolam HCl (PF) (VERSED) injection 4 mg, 4 mg, Intravenous, Q15 Min PRN **OR** Midazolam HCl (PF) (VERSED) injection 4 mg, 4 mg, Intramuscular, Q15 Min Dariel FLORES Rusty T, MD    Magnesium Standard Dose Replacement - Follow Nurse / BPA Driven Protocol, , Does not apply, Dariel FLORES Rusty T, MD    metoprolol succinate XL (TOPROL-XL) 24 hr tablet 25 mg, 25 mg, Oral, Q24H, Barrera Guadalupe PA-C, 25 mg at 02/29/24 0849    nitroglycerin  (NITROSTAT) SL tablet 0.4 mg, 0.4 mg, Sublingual, Q5 Min PRN, Anand Vieira MD    pantoprazole (PROTONIX) EC tablet 40 mg, 40 mg, Oral, Q AM, Anand Vieira MD, 40 mg at 02/29/24 0632    rivaroxaban (XARELTO) tablet 10 mg, 10 mg, Oral, Daily With Dinner, Anand Vieira MD, 10 mg at 02/28/24 1724    thiamine (B-1) injection 200 mg, 200 mg, Intravenous, Q8H, 200 mg at 02/29/24 0632 **FOLLOWED BY** [START ON 3/3/2024] thiamine (VITAMIN B-1) tablet 100 mg, 100 mg, Oral, Daily, Anand Vieira MD      Assessment & Plan     Acute-on-Chronic HFrEF exacerbation: Net I&O balance was -1.4L.  However, renal function continues to deteriorate with no improvement w/ gentle fluid bolus.  Holding diureses currently and will hold Jardiance.  Will ask renal to see.  Sinus Tachycardia: Resolved.  Leukocytosis: Persisting, but trending down w/o intervention.  Continue to monitor.  EtOH Abuse/Withdrawal: Resolved.  Continue protocol.  Diabetes Mellitus, Type 2: Bedsides and A.M. at goal.  Will D/C Glucommander and follow accuchecks.  CAD: No acute issues.  [facility]  Hx/O Seizure: Continues on Keppra.    Plan for disposition: Predicated on hospital course.    Anand Vieira MD  02/29/24  13:15 EST

## 2024-03-01 ENCOUNTER — READMISSION MANAGEMENT (OUTPATIENT)
Dept: CALL CENTER | Facility: HOSPITAL | Age: 79
End: 2024-03-01
Payer: OTHER GOVERNMENT

## 2024-03-01 VITALS
WEIGHT: 191.4 LBS | RESPIRATION RATE: 18 BRPM | OXYGEN SATURATION: 96 % | SYSTOLIC BLOOD PRESSURE: 123 MMHG | HEART RATE: 114 BPM | HEIGHT: 71 IN | TEMPERATURE: 97.5 F | BODY MASS INDEX: 26.8 KG/M2 | DIASTOLIC BLOOD PRESSURE: 95 MMHG

## 2024-03-01 PROBLEM — I50.23 ACUTE ON CHRONIC HFREF (HEART FAILURE WITH REDUCED EJECTION FRACTION): Status: ACTIVE | Noted: 2024-03-01

## 2024-03-01 PROBLEM — I50.9 ACUTE EXACERBATION OF CHF (CONGESTIVE HEART FAILURE): Status: RESOLVED | Noted: 2024-02-29 | Resolved: 2024-03-01

## 2024-03-01 LAB
ANION GAP SERPL CALCULATED.3IONS-SCNC: 12.6 MMOL/L (ref 5–15)
BUN SERPL-MCNC: 41 MG/DL (ref 8–23)
BUN/CREAT SERPL: 22 (ref 7–25)
CALCIUM SPEC-SCNC: 8.9 MG/DL (ref 8.6–10.5)
CHLORIDE SERPL-SCNC: 102 MMOL/L (ref 98–107)
CO2 SERPL-SCNC: 22.4 MMOL/L (ref 22–29)
CREAT SERPL-MCNC: 1.86 MG/DL (ref 0.76–1.27)
EGFRCR SERPLBLD CKD-EPI 2021: 36.6 ML/MIN/1.73
GLUCOSE BLDC GLUCOMTR-MCNC: 117 MG/DL (ref 70–130)
GLUCOSE BLDC GLUCOMTR-MCNC: 123 MG/DL (ref 70–130)
GLUCOSE BLDC GLUCOMTR-MCNC: 76 MG/DL (ref 70–130)
GLUCOSE SERPL-MCNC: 118 MG/DL (ref 65–99)
POTASSIUM SERPL-SCNC: 4 MMOL/L (ref 3.5–5.2)
SODIUM SERPL-SCNC: 137 MMOL/L (ref 136–145)

## 2024-03-01 PROCEDURE — 63710000001 INSULIN ASPART PER 5 UNITS: Performed by: HOSPITALIST

## 2024-03-01 PROCEDURE — 25010000002 THIAMINE HCL 200 MG/2ML SOLUTION: Performed by: HOSPITALIST

## 2024-03-01 PROCEDURE — 94761 N-INVAS EAR/PLS OXIMETRY MLT: CPT

## 2024-03-01 PROCEDURE — 82948 REAGENT STRIP/BLOOD GLUCOSE: CPT

## 2024-03-01 PROCEDURE — 99239 HOSP IP/OBS DSCHRG MGMT >30: CPT | Performed by: STUDENT IN AN ORGANIZED HEALTH CARE EDUCATION/TRAINING PROGRAM

## 2024-03-01 PROCEDURE — 80048 BASIC METABOLIC PNL TOTAL CA: CPT | Performed by: INTERNAL MEDICINE

## 2024-03-01 RX ORDER — LISINOPRIL 20 MG/1
10 TABLET ORAL DAILY
Qty: 30 TABLET | Refills: 0 | Status: SHIPPED | OUTPATIENT
Start: 2024-03-01

## 2024-03-01 RX ADMIN — CLOPIDOGREL BISULFATE 75 MG: 75 TABLET ORAL at 08:49

## 2024-03-01 RX ADMIN — LEVETIRACETAM 500 MG: 500 TABLET, FILM COATED ORAL at 08:50

## 2024-03-01 RX ADMIN — GABAPENTIN 300 MG: 300 CAPSULE ORAL at 14:48

## 2024-03-01 RX ADMIN — INSULIN ASPART 8 UNITS: 100 INJECTION, SOLUTION INTRAVENOUS; SUBCUTANEOUS at 17:47

## 2024-03-01 RX ADMIN — GLIPIZIDE 5 MG: 5 TABLET ORAL at 08:49

## 2024-03-01 RX ADMIN — THIAMINE HYDROCHLORIDE 200 MG: 100 INJECTION, SOLUTION INTRAMUSCULAR; INTRAVENOUS at 06:03

## 2024-03-01 RX ADMIN — INSULIN ASPART 3 UNITS: 100 INJECTION, SOLUTION INTRAVENOUS; SUBCUTANEOUS at 08:49

## 2024-03-01 RX ADMIN — HYDROCODONE BITARTRATE AND ACETAMINOPHEN 1 TABLET: 10; 325 TABLET ORAL at 04:37

## 2024-03-01 RX ADMIN — THIAMINE HYDROCHLORIDE 200 MG: 100 INJECTION, SOLUTION INTRAMUSCULAR; INTRAVENOUS at 14:48

## 2024-03-01 RX ADMIN — METOPROLOL SUCCINATE 25 MG: 25 TABLET, EXTENDED RELEASE ORAL at 08:49

## 2024-03-01 RX ADMIN — INSULIN ASPART 4 UNITS: 100 INJECTION, SOLUTION INTRAVENOUS; SUBCUTANEOUS at 12:57

## 2024-03-01 RX ADMIN — LEVOTHYROXINE SODIUM 75 MCG: 75 TABLET ORAL at 06:03

## 2024-03-01 RX ADMIN — PANTOPRAZOLE SODIUM 40 MG: 40 TABLET, DELAYED RELEASE ORAL at 06:03

## 2024-03-01 RX ADMIN — GABAPENTIN 300 MG: 300 CAPSULE ORAL at 06:03

## 2024-03-01 RX ADMIN — ALLOPURINOL 200 MG: 100 TABLET ORAL at 08:50

## 2024-03-01 RX ADMIN — FLUOXETINE HYDROCHLORIDE 60 MG: 20 CAPSULE ORAL at 08:50

## 2024-03-01 RX ADMIN — RIVAROXABAN 10 MG: 10 TABLET, FILM COATED ORAL at 17:47

## 2024-03-01 RX ADMIN — FOLIC ACID 1 MG: 1 TABLET ORAL at 08:50

## 2024-03-01 NOTE — PLAN OF CARE
Goal Outcome Evaluation:  Plan of Care Reviewed With: patient        Progress: improving  Outcome Evaluation: ADRIÁN GOMEZ. Medicated for chronic back pain x2 with good effect. Sinus  Tach top NSR on monitor. On glucommander.

## 2024-03-01 NOTE — PROGRESS NOTES
"SERVICE: [facility] HOSPITALIST    CONSULTANTS: Cardiology, Nephrology    CHIEF COMPLAINT: Follow-up Acute-on-Chronic CHF; Renal Dysfunction     SUBJECTIVE: Patient seen and examined at bedside. Patient reports feeling well, would like to go home.  He is eating, drinking, ambulating around room. Patient denies headache, fever or chills, nausea, vomiting, diarrhea, abdominal pain, chest pain or palpitations, shortness of breath, wheezing or coughing, leg pain or weakness.     OBJECTIVE:    Physical exam is largely unchanged from previous exam, except where documented below, examination is accurate as of 3/1/2024    Physical Exam:  General: Patient is awake and alert. Well developed and well nourished. No acute distress noted.   HENT: Head is atraumatic, normocephalic. Hearing is grossly intact. Trachea is midline.   Eyes: Vision is grossly intact. Pupils appear equal and round.   Cardiovascular: Heart has irregular rate and rhythm with no murmurs, rubs or gallops noted.   Respiratory: Lungs are clear to ausculation without wheezes, rhonchi or rales.   Abdominal/GI: Soft, nontender, bowel sounds present. No rebound or guarding present.   Extremities: No peripheral edema noted.   Musculoskeletal: Spontaneous movement of bilateral upper and lower extremities against gravity noted. No signs of injury or deformity noted.   Skin: Warm and dry.   Psych: Mood and affect are appropriate. Cooperative with exam.   Neuro: No facial asymmetry noted. No focal deficits noted, hearing and vision are grossly intact.     /95 (BP Location: Right arm, Patient Position: Sitting)   Pulse 114   Temp 97.5 °F (36.4 °C) (Oral)   Resp 18   Ht 180.3 cm (71\")   Wt 86.8 kg (191 lb 6.4 oz)   SpO2 96%   BMI 26.69 kg/m²     MEDS/LABS REVIEWED AND ORDERED            LAB/DIAGNOSTICS:    Lab Results (last 24 hours)       Procedure Component Value Units Date/Time    POC Glucose Once [[id number]]  (Normal) Collected: " 03/01/24 1709    Specimen: Blood Updated: 03/01/24 1718     Glucose 117 mg/dL     POC Glucose Once [[id number]]  (Normal) Collected: 03/01/24 1203    Specimen: Blood Updated: 03/01/24 1210     Glucose 76 mg/dL     Blood Culture - Blood, Arm, Right [[id number]]  (Normal) Collected: 02/27/24 1106    Specimen: Blood from Arm, Right Updated: 03/01/24 1145     Blood Culture No growth at 3 days    Blood Culture - Blood, Arm, Left [[id number]]  (Normal) Collected: 02/27/24 1056    Specimen: Blood from Arm, Left Updated: 03/01/24 1145     Blood Culture No growth at 3 days    POC Glucose Once [[id number]]  (Normal) Collected: 03/01/24 0826    Specimen: Blood Updated: 03/01/24 0839     Glucose 123 mg/dL     Basic Metabolic Panel [[id number]]  (Abnormal) Collected: 03/01/24 0412    Specimen: Blood Updated: 03/01/24 0511     Glucose 118 mg/dL      BUN 41 mg/dL      Creatinine 1.86 mg/dL      Sodium 137 mmol/L      Potassium 4.0 mmol/L      Chloride 102 mmol/L      CO2 22.4 mmol/L      Calcium 8.9 mg/dL      BUN/Creatinine Ratio 22.0     Anion Gap 12.6 mmol/L      eGFR 36.6 mL/min/1.73     Narrative:      GFR Normal >60  Chronic Kidney Disease <60  Kidney Failure <15    The GFR formula is only valid for adults with stable renal function between ages 18 and 70.    POC Glucose Once [[id number]]  (Normal) Collected: 02/29/24 2103    Specimen: Blood Updated: 02/29/24 2109     Glucose 130 mg/dL           ECG 12 Lead Dyspnea   Final Result   HEART RATE= 122  bpm   RR Interval= 492  ms   TX Interval= 142  ms   P Horizontal Axis= -23  deg   P Front Axis= 28  deg   QRSD Interval= 91  ms   QT Interval= 324  ms   QTcB= 462  ms   QRS Axis= 16  deg   T Wave Axis= 169  deg   - ABNORMAL ECG -   Sinus tachycardia with PACs   Anteroseptal infarct, old   Abnormal T, consider ischemia, lateral leads   NO PRIOR TRACING AVAILABLE FOR COMPARISON   Electronically Signed By: Vibha Hayward ([facility] 27-Feb-2024 14:31:07   Date and Time of Study: 2024-02-27  09:42:04        Results for orders placed during the hospital encounter of 02/27/24    Adult Transthoracic Echo Complete W/ Cont if Necessary Per Protocol    Interpretation Summary    Left ventricular systolic function is severely decreased. Calculated left ventricular EF = 35%; visually estimated LVEF 20-25%.  There is global hypokinesis with regional variations.    The left ventricular cavity is moderately dilated.    RV normal in size and function.    No significant valvular abnormalities.    Normal biatrial and IVC size.    US Renal Bilateral    Result Date: 3/1/2024  1.Evidence for a cysts at the lower pole of the left kidney. 2.There is no hydronephrosis bilaterally. Electronically Signed: Robert Reed MD  3/1/2024 7:04 AM EST  Workstation ID: [id number]       ASSESSMENT/PLAN:  Please note portions of this assessment/plan may have been copied and pasted, but I have personally seen this patient and reviewed each line of this assessment and plan for accuracy and made updates to reflect my necessary changes on 3/1/2024    Acute-on-Chronic HFrEF exacerbation: Due to EtOH versus ischemia.  Cardiology recommending titration of current lisinopril, Toprol, Jardiance for management.  Patient improving symptomatically.    Sinus tachycardia: Resolved.    Leukocytosis: Has trended down, nearly resolved.    EtOH abuse/withdrawal: Resolved    T2DM: Continue home meds at discharge    CAD: Stable.  Cardiology agrees with [facility]    PLAN FOR DISPOSITION: Discharge to home today.    Dewayne Jenkins MD  Hospitalist, Family Medicine  [facility]  03/01/24  07:10 EST    At [facility], we believe that sharing information builds trust and better relationships. You are receiving this note because you recently visited [facility]. It is possible you will see health information before a provider has talked with you about it. This kind of information can be easy  "to misunderstand. To help you fully understand what it means for your health, we urge you to discuss this note with your provider.    \"Dictated utilizing Dragon dictation\"    "

## 2024-03-01 NOTE — PLAN OF CARE
Goal Outcome Evaluation:  Plan of Care Reviewed With: patient        Progress: improving  Outcome Evaluation: Pt up in chair most of the day. Running Sinus Tach, 104-115 on monitor. Glucommander monitered. Expected discharge today.

## 2024-03-02 ENCOUNTER — APPOINTMENT (OUTPATIENT)
Dept: GENERAL RADIOLOGY | Facility: HOSPITAL | Age: 79
End: 2024-03-02
Payer: OTHER GOVERNMENT

## 2024-03-02 ENCOUNTER — HOSPITAL ENCOUNTER (EMERGENCY)
Facility: HOSPITAL | Age: 79
Discharge: HOME | End: 2024-03-02
Attending: EMERGENCY MEDICINE
Payer: OTHER GOVERNMENT

## 2024-03-02 VITALS
SYSTOLIC BLOOD PRESSURE: 117 MMHG | HEART RATE: 105 BPM | TEMPERATURE: 97.7 F | RESPIRATION RATE: 23 BRPM | HEIGHT: 71 IN | DIASTOLIC BLOOD PRESSURE: 91 MMHG | OXYGEN SATURATION: 95 % | WEIGHT: 192 LBS | BODY MASS INDEX: 26.88 KG/M2

## 2024-03-02 DIAGNOSIS — I50.23 ACUTE ON CHRONIC SYSTOLIC CONGESTIVE HEART FAILURE: Primary | ICD-10-CM

## 2024-03-02 LAB
ALBUMIN SERPL-MCNC: 4.4 G/DL (ref 3.5–5.2)
ALBUMIN/GLOB SERPL: 1.5 G/DL
ALP SERPL-CCNC: 86 U/L (ref 39–117)
ALT SERPL W P-5'-P-CCNC: 14 U/L (ref 1–41)
ANION GAP SERPL CALCULATED.3IONS-SCNC: 18.4 MMOL/L (ref 5–15)
AST SERPL-CCNC: 15 U/L (ref 1–40)
BASOPHILS # BLD AUTO: 0.13 10*3/MM3 (ref 0–0.2)
BASOPHILS NFR BLD AUTO: 0.8 % (ref 0–1.5)
BILIRUB SERPL-MCNC: 0.5 MG/DL (ref 0–1.2)
BILIRUB UR QL STRIP: NEGATIVE
BUN SERPL-MCNC: 33 MG/DL (ref 8–23)
BUN/CREAT SERPL: 17.6 (ref 7–25)
CALCIUM SPEC-SCNC: 9.8 MG/DL (ref 8.6–10.5)
CHLORIDE SERPL-SCNC: 100 MMOL/L (ref 98–107)
CLARITY UR: CLEAR
CO2 SERPL-SCNC: 24.6 MMOL/L (ref 22–29)
COLOR UR: YELLOW
CREAT SERPL-MCNC: 1.87 MG/DL (ref 0.76–1.27)
D DIMER PPP FEU-MCNC: 1.61 MCGFEU/ML (ref 0–0.78)
DEPRECATED RDW RBC AUTO: 50.6 FL (ref 37–54)
EGFRCR SERPLBLD CKD-EPI 2021: 36.3 ML/MIN/1.73
EOSINOPHIL # BLD AUTO: 0.17 10*3/MM3 (ref 0–0.4)
EOSINOPHIL NFR BLD AUTO: 1 % (ref 0.3–6.2)
ERYTHROCYTE [DISTWIDTH] IN BLOOD BY AUTOMATED COUNT: 19.6 % (ref 12.3–15.4)
GEN 5 2HR TROPONIN T REFLEX: 68 NG/L
GLOBULIN UR ELPH-MCNC: 3 GM/DL
GLUCOSE SERPL-MCNC: 171 MG/DL (ref 65–99)
GLUCOSE UR STRIP-MCNC: ABNORMAL MG/DL
HCT VFR BLD AUTO: 52.3 % (ref 37.5–51)
HGB BLD-MCNC: 15.3 G/DL (ref 13–17.7)
HGB UR QL STRIP.AUTO: NEGATIVE
IMM GRANULOCYTES # BLD AUTO: 0.13 10*3/MM3 (ref 0–0.05)
IMM GRANULOCYTES NFR BLD AUTO: 0.8 % (ref 0–0.5)
KETONES UR QL STRIP: NEGATIVE
LEUKOCYTE ESTERASE UR QL STRIP.AUTO: NEGATIVE
LYMPHOCYTES # BLD AUTO: 1.47 10*3/MM3 (ref 0.7–3.1)
LYMPHOCYTES NFR BLD AUTO: 8.6 % (ref 19.6–45.3)
MCH RBC QN AUTO: 22.3 PG (ref 26.6–33)
MCHC RBC AUTO-ENTMCNC: 29.3 G/DL (ref 31.5–35.7)
MCV RBC AUTO: 76.2 FL (ref 79–97)
MONOCYTES # BLD AUTO: 1.08 10*3/MM3 (ref 0.1–0.9)
MONOCYTES NFR BLD AUTO: 6.3 % (ref 5–12)
NEUTROPHILS NFR BLD AUTO: 14.07 10*3/MM3 (ref 1.7–7)
NEUTROPHILS NFR BLD AUTO: 82.5 % (ref 42.7–76)
NITRITE UR QL STRIP: NEGATIVE
NT-PROBNP SERPL-MCNC: 9993 PG/ML (ref 0–1800)
PH UR STRIP.AUTO: 6.5 [PH] (ref 4.5–8)
PLATELET # BLD AUTO: 411 10*3/MM3 (ref 140–450)
PMV BLD AUTO: 11.3 FL (ref 6–12)
POTASSIUM SERPL-SCNC: 4.3 MMOL/L (ref 3.5–5.2)
PROCALCITONIN SERPL-MCNC: 0.08 NG/ML (ref 0–0.25)
PROT SERPL-MCNC: 7.4 G/DL (ref 6–8.5)
PROT UR QL STRIP: NEGATIVE
QT INTERVAL: 356 MS
QT INTERVAL: 369 MS
QTC INTERVAL: 492 MS
QTC INTERVAL: 504 MS
RBC # BLD AUTO: 6.86 10*6/MM3 (ref 4.14–5.8)
SODIUM SERPL-SCNC: 143 MMOL/L (ref 136–145)
SP GR UR STRIP: 1.01 (ref 1–1.03)
TROPONIN T DELTA: -5 NG/L
TROPONIN T SERPL HS-MCNC: 73 NG/L
UROBILINOGEN UR QL STRIP: ABNORMAL
WBC NRBC COR # BLD AUTO: 17.05 10*3/MM3 (ref 3.4–10.8)

## 2024-03-02 PROCEDURE — 85025 COMPLETE CBC W/AUTO DIFF WBC: CPT | Performed by: EMERGENCY MEDICINE

## 2024-03-02 PROCEDURE — 71045 X-RAY EXAM CHEST 1 VIEW: CPT

## 2024-03-02 PROCEDURE — 84484 ASSAY OF TROPONIN QUANT: CPT | Performed by: EMERGENCY MEDICINE

## 2024-03-02 PROCEDURE — 85379 FIBRIN DEGRADATION QUANT: CPT | Performed by: EMERGENCY MEDICINE

## 2024-03-02 PROCEDURE — 93005 ELECTROCARDIOGRAM TRACING: CPT | Performed by: EMERGENCY MEDICINE

## 2024-03-02 PROCEDURE — 36415 COLL VENOUS BLD VENIPUNCTURE: CPT

## 2024-03-02 PROCEDURE — 80053 COMPREHEN METABOLIC PANEL: CPT | Performed by: EMERGENCY MEDICINE

## 2024-03-02 PROCEDURE — 96374 THER/PROPH/DIAG INJ IV PUSH: CPT

## 2024-03-02 PROCEDURE — 99284 EMERGENCY DEPT VISIT MOD MDM: CPT

## 2024-03-02 PROCEDURE — 81003 URINALYSIS AUTO W/O SCOPE: CPT | Performed by: EMERGENCY MEDICINE

## 2024-03-02 PROCEDURE — 93010 ELECTROCARDIOGRAM REPORT: CPT | Performed by: INTERNAL MEDICINE

## 2024-03-02 PROCEDURE — 84145 PROCALCITONIN (PCT): CPT | Performed by: EMERGENCY MEDICINE

## 2024-03-02 PROCEDURE — 83880 ASSAY OF NATRIURETIC PEPTIDE: CPT | Performed by: EMERGENCY MEDICINE

## 2024-03-02 PROCEDURE — 25010000002 FUROSEMIDE PER 20 MG: Performed by: EMERGENCY MEDICINE

## 2024-03-02 RX ORDER — FUROSEMIDE 10 MG/ML
80 INJECTION INTRAMUSCULAR; INTRAVENOUS ONCE
Status: COMPLETED | OUTPATIENT
Start: 2024-03-02 | End: 2024-03-02

## 2024-03-02 RX ORDER — FUROSEMIDE 10 MG/ML
80 INJECTION INTRAMUSCULAR; INTRAVENOUS ONCE
Status: DISCONTINUED | OUTPATIENT
Start: 2024-03-02 | End: 2024-03-02

## 2024-03-02 RX ADMIN — FUROSEMIDE 80 MG: 10 INJECTION INTRAMUSCULAR; INTRAVENOUS at 03:16

## 2024-03-02 NOTE — DISCHARGE INSTRUCTIONS
[facility]  Start taking them as directed.  Follow-up with your cardiologist as directed on your discharge from the hospital.  Return to emergency department if you have worsening shortness of breath, worse in any way at all.

## 2024-03-02 NOTE — ED PROVIDER NOTES
Subjective   History of Present Illness    Chief complaint: Short of air    Location: Home    Quality/Severity: Moderate    Timing/Onset/Duration: Getting worse since discharge    Modifying Factors: Worse with exertion    Associated Symptoms: No headache.  No fever chills or cough.  No sore throat earache or nasal congestion.  No chest pain.  No abdominal pain.  No diarrhea or burning when he urinates.  No nausea or vomiting.    Narrative: This 78-year-old white male was discharged yesterday from [facility] with congestive heart failure.  The patient [facility] not get [facility].    [facility]    Review of Systems   Constitutional:  Negative for chills, diaphoresis and fever.   HENT:  Negative for congestion, ear pain and sore throat.    Respiratory:  Positive for shortness of breath. Negative for cough.    Cardiovascular:  Negative for chest pain.   Gastrointestinal:  Negative for abdominal pain, diarrhea, nausea and vomiting.   Genitourinary:  Negative for difficulty urinating.   Musculoskeletal:  Negative for back pain.   Skin:  Negative for rash.   Neurological:  Negative for headaches.         Past Medical History:   Diagnosis Date   • Alcohol withdrawal    • CHF (congestive heart failure)    • Chronic pain    • Coronary artery disease    • Diabetes mellitus    • Hypertension    • Metabolic encephalopathy        No Known Allergies    No past surgical history on file.    No family history on file.    Social History     Socioeconomic History   • Marital status:    Tobacco Use   • Smoking status: Former     Current packs/day: 0.00     Average packs/day: 1 pack/day for 25.0 years (25.0 ttl pk-yrs)     Types: Cigarettes     Start date: 1999     Quit date: 2024     Years since quittin.0   • Smokeless tobacco: Never   Vaping Use   • Vaping status: Never Used   Substance and Sexual Activity   • Alcohol use: Not Currently     Comment: drinks 6-7 shots per day,  vodka, fireball   • Drug use: Yes     Types: Hydrocodone   • Sexual activity: Defer           Objective   Physical Exam  Vitals (The blood pressure is 134/89, temperature is 97.7 °F, pulse 116, respirations 25, room air pulse ox 92%.) and nursing note reviewed.   Constitutional:       Appearance: Normal appearance.   HENT:      Head: Normocephalic and atraumatic.      Right Ear: Tympanic membrane normal.      Left Ear: Tympanic membrane normal.      Mouth/Throat:      Mouth: Mucous membranes are moist.   Cardiovascular:      Rate and Rhythm: Normal rate and regular rhythm.      Pulses: Normal pulses.      Heart sounds: Normal heart sounds. No murmur heard.     No friction rub. No gallop.   Pulmonary:      Breath sounds: Rales present.   Abdominal:      General: Abdomen is flat. Bowel sounds are normal. There is no distension.      Palpations: Abdomen is soft. There is no mass.      Tenderness: There is no abdominal tenderness. There is no guarding or rebound.      Hernia: No hernia is present.   Musculoskeletal:         General: No swelling or tenderness. Normal range of motion.      Cervical back: Normal range of motion and neck supple.   Skin:     General: Skin is warm and dry.      Findings: No rash.   Neurological:      General: No focal deficit present.      Mental Status: He is alert and oriented to person, place, and time.         Procedures           ED Course  ED Course as of 03/02/24 0527   Sat Mar 02, 2024   0330 The white blood cell count is 17, hemoglobin 15, hematocrit 52, platelet count 4 11,000, neutrophil percent 82% with an absolute neutrophil count of 14.  CBC is otherwise unremarkable. [RC]   0331 On 2/29/2024 the white blood cell count was 13.84. [RC]   0331 The white blood cell count is elevated today. [RC]   0346 The high-sensitivity troponin is 73 and elevated. [RC]   0347 4 days ago the high-sensitivity troponin was as high as 65. [RC]   0347 The D-dimer is 1.61.  This is elevated. [RC]    0347 The D-dimer was 1.864 days ago, the D-dimer is actually better today. [RC]   0347 The glucose is 171, BUN is 33 and creatinine is 1.87, the cysts GFR is 36.  The CMP is otherwise unremarkable. [RC]   0348 On March 1, 2021 the BUN is 41 with a creatinine of 1.86.  The BUN is improved today. [RC]   0355 The proBNP is 9993 and elevated. [RC]   0356 On 2/27/2024 the proBNP was 13,365.  The proBNP today is down from 2/27/2024. [RC]   0520 The delta T is -5.  The high-sensitivity troponin is 68. [RC]      ED Course User Index  [RC] José Miguel Sparks MD      02:57 EST, 03/02/24:  Echo 2/27/2024Interpretation Summary       •  Left ventricular systolic function is severely decreased. Calculated left ventricular EF = 35%; visually estimated LVEF 20-25%.  There is global hypokinesis with regional variations.  •  The left ventricular cavity is moderately dilated.  •  RV normal in size and function.  •  No significant valvular abnormalities.  •  Normal biatrial and IVC size.    Ultrasound of the kidney bilaterally on 3/1/2024 there is a cyst at the lower pole of left kidney.  There is no hydronephrosis bilaterally.    03:11 EST, 03/02/24:  Initial EKG has artifact in multiple leads with a wandering baseline.  There is a sinus tach with a rate of 114.  The PA, QRS, and QT intervals are unremarkable.  There is no acute ST elevation.  The EKG was obtained at 305 and read by me at 306.    Repeat EKG was obtained at 340 and read by me at 342.  EKG shows a sinus tachycardia with rate of 113.  There is a normal axis with no hypertrophy.  The PA, QRS intervals are unremarkable.  The QT is prolonged at 504 ms.  Poor anterior R wave progression and PAC.  There is no acute ST elevation or depression.                              05:21 EST, 03/02/24:  The patient was reassessed.  He feels better.  He has urinated 1150 cc of urine.  The patient's diagnosis of congestive heart failure was discussed with him.  He has been advised to  get his medications this morning at 9 AM and begin taking them as directed.  He should follow-up as directed on his discharge instructions from earlier in the week.  The patient should return to the emergency department if there is worsening shortness of breath, worse in any way at all.  All the patient questions answered he will be discharged in good condition            Medical Decision Making      Final diagnoses:   None       ED Disposition  ED Disposition       None            No follow-up provider specified.       Medication List      No changes were made to your prescriptions during this visit.            José Miguel Sparks MD  03/02/24 0528

## 2024-03-03 LAB
BACTERIA SPEC AEROBE CULT: NORMAL
BACTERIA SPEC AEROBE CULT: NORMAL

## 2024-03-03 NOTE — CASE MANAGEMENT/SOCIAL WORK
Case Management Discharge Note      Final Note: home    Provided Post Acute Provider List?: N/A  Provided Post Acute Provider Quality & Resource List?: N/A    Selected Continued Care - Discharged on 3/1/2024 Admission date: 2/27/2024 - Discharge disposition: Home or Self Care      Destination    No services have been selected for the patient.                Durable Medical Equipment    No services have been selected for the patient.                Dialysis/Infusion    No services have been selected for the patient.                Home Medical Care    No services have been selected for the patient.                Therapy    No services have been selected for the patient.                Community Resources    No services have been selected for the patient.                Community & DME    No services have been selected for the patient.                         Final Discharge Disposition Code: 01 - home or self-care

## 2024-03-05 ENCOUNTER — READMISSION MANAGEMENT (OUTPATIENT)
Dept: CALL CENTER | Facility: HOSPITAL | Age: 79
End: 2024-03-05
Payer: OTHER GOVERNMENT

## 2024-03-05 NOTE — OUTREACH NOTE
CHF Week 1 Survey      Flowsheet Row Responses   [facility] patient discharged from? [place]   Does the patient have one of the following disease processes/diagnoses(primary or secondary)? CHF   CHF Week 1 attempt successful? No   Unsuccessful attempts Attempt 1   Revoke Janette TONG - Registered Nurse

## 2024-03-12 LAB — GLUCOSE BLDC GLUCOMTR-MCNC: 319 MG/DL (ref 70–130)

## 2024-03-17 ENCOUNTER — HOSPITAL ENCOUNTER (EMERGENCY)
Facility: HOSPITAL | Age: 79
Discharge: LEFT BEFORE DISCHARGE | End: 2024-03-17
Attending: EMERGENCY MEDICINE | Admitting: EMERGENCY MEDICINE
Payer: OTHER GOVERNMENT

## 2024-03-17 ENCOUNTER — APPOINTMENT (OUTPATIENT)
Dept: CT IMAGING | Facility: HOSPITAL | Age: 79
End: 2024-03-17
Payer: OTHER GOVERNMENT

## 2024-03-17 ENCOUNTER — APPOINTMENT (OUTPATIENT)
Dept: GENERAL RADIOLOGY | Facility: HOSPITAL | Age: 79
End: 2024-03-17
Payer: OTHER GOVERNMENT

## 2024-03-17 VITALS
OXYGEN SATURATION: 94 % | WEIGHT: 192 LBS | HEART RATE: 111 BPM | TEMPERATURE: 97.5 F | RESPIRATION RATE: 24 BRPM | BODY MASS INDEX: 26.88 KG/M2 | HEIGHT: 71 IN | DIASTOLIC BLOOD PRESSURE: 98 MMHG | SYSTOLIC BLOOD PRESSURE: 135 MMHG

## 2024-03-17 DIAGNOSIS — R79.89 ELEVATED LACTIC ACID LEVEL: ICD-10-CM

## 2024-03-17 DIAGNOSIS — I50.23 ACUTE ON CHRONIC SYSTOLIC CONGESTIVE HEART FAILURE: Primary | ICD-10-CM

## 2024-03-17 LAB
ALBUMIN SERPL-MCNC: 4.4 G/DL (ref 3.5–5.2)
ALBUMIN/GLOB SERPL: 1.8 G/DL
ALP SERPL-CCNC: 93 U/L (ref 39–117)
ALT SERPL W P-5'-P-CCNC: 16 U/L (ref 1–41)
ANION GAP SERPL CALCULATED.3IONS-SCNC: 17.7 MMOL/L (ref 5–15)
AST SERPL-CCNC: 15 U/L (ref 1–40)
BASOPHILS # BLD AUTO: 0.13 10*3/MM3 (ref 0–0.2)
BASOPHILS NFR BLD AUTO: 1 % (ref 0–1.5)
BILIRUB SERPL-MCNC: 0.6 MG/DL (ref 0–1.2)
BUN SERPL-MCNC: 15 MG/DL (ref 8–23)
BUN/CREAT SERPL: 8.6 (ref 7–25)
CALCIUM SPEC-SCNC: 10 MG/DL (ref 8.6–10.5)
CHLORIDE SERPL-SCNC: 101 MMOL/L (ref 98–107)
CO2 SERPL-SCNC: 24.3 MMOL/L (ref 22–29)
CREAT SERPL-MCNC: 1.74 MG/DL (ref 0.76–1.27)
D DIMER PPP FEU-MCNC: 2.22 MCGFEU/ML (ref 0–0.78)
D-LACTATE SERPL-SCNC: 1.4 MMOL/L (ref 0.5–2)
D-LACTATE SERPL-SCNC: 7.4 MMOL/L (ref 0.5–2)
DEPRECATED RDW RBC AUTO: 49.6 FL (ref 37–54)
EGFRCR SERPLBLD CKD-EPI 2021: 39.6 ML/MIN/1.73
EOSINOPHIL # BLD AUTO: 0.07 10*3/MM3 (ref 0–0.4)
EOSINOPHIL NFR BLD AUTO: 0.5 % (ref 0.3–6.2)
ERYTHROCYTE [DISTWIDTH] IN BLOOD BY AUTOMATED COUNT: 20.1 % (ref 12.3–15.4)
FLUAV RNA RESP QL NAA+PROBE: NOT DETECTED
FLUBV RNA RESP QL NAA+PROBE: NOT DETECTED
GEN 5 2HR TROPONIN T REFLEX: 57 NG/L
GLOBULIN UR ELPH-MCNC: 2.5 GM/DL
GLUCOSE SERPL-MCNC: 113 MG/DL (ref 65–99)
HCT VFR BLD AUTO: 50 % (ref 37.5–51)
HGB BLD-MCNC: 15 G/DL (ref 13–17.7)
HOLD SPECIMEN: NORMAL
HOLD SPECIMEN: NORMAL
IMM GRANULOCYTES # BLD AUTO: 0.12 10*3/MM3 (ref 0–0.05)
IMM GRANULOCYTES NFR BLD AUTO: 0.9 % (ref 0–0.5)
LYMPHOCYTES # BLD AUTO: 1.02 10*3/MM3 (ref 0.7–3.1)
LYMPHOCYTES NFR BLD AUTO: 7.7 % (ref 19.6–45.3)
MCH RBC QN AUTO: 22.8 PG (ref 26.6–33)
MCHC RBC AUTO-ENTMCNC: 30 G/DL (ref 31.5–35.7)
MCV RBC AUTO: 75.9 FL (ref 79–97)
MONOCYTES # BLD AUTO: 0.98 10*3/MM3 (ref 0.1–0.9)
MONOCYTES NFR BLD AUTO: 7.4 % (ref 5–12)
NEUTROPHILS NFR BLD AUTO: 10.97 10*3/MM3 (ref 1.7–7)
NEUTROPHILS NFR BLD AUTO: 82.5 % (ref 42.7–76)
NRBC BLD AUTO-RTO: 0 /100 WBC (ref 0–0.2)
NT-PROBNP SERPL-MCNC: ABNORMAL PG/ML (ref 0–1800)
PLATELET # BLD AUTO: 368 10*3/MM3 (ref 140–450)
PMV BLD AUTO: 11.3 FL (ref 6–12)
POTASSIUM SERPL-SCNC: 4.2 MMOL/L (ref 3.5–5.2)
PROCALCITONIN SERPL-MCNC: 0.05 NG/ML (ref 0–0.25)
PROT SERPL-MCNC: 6.9 G/DL (ref 6–8.5)
RBC # BLD AUTO: 6.59 10*6/MM3 (ref 4.14–5.8)
SARS-COV-2 RNA RESP QL NAA+PROBE: NOT DETECTED
SODIUM SERPL-SCNC: 143 MMOL/L (ref 136–145)
TROPONIN T DELTA: -4 NG/L
TROPONIN T SERPL HS-MCNC: 61 NG/L
WBC NRBC COR # BLD AUTO: 13.29 10*3/MM3 (ref 3.4–10.8)
WHOLE BLOOD HOLD COAG: NORMAL
WHOLE BLOOD HOLD SPECIMEN: NORMAL

## 2024-03-17 PROCEDURE — 84145 PROCALCITONIN (PCT): CPT | Performed by: EMERGENCY MEDICINE

## 2024-03-17 PROCEDURE — 93010 ELECTROCARDIOGRAM REPORT: CPT | Performed by: INTERNAL MEDICINE

## 2024-03-17 PROCEDURE — 84484 ASSAY OF TROPONIN QUANT: CPT | Performed by: EMERGENCY MEDICINE

## 2024-03-17 PROCEDURE — 71275 CT ANGIOGRAPHY CHEST: CPT

## 2024-03-17 PROCEDURE — 99285 EMERGENCY DEPT VISIT HI MDM: CPT

## 2024-03-17 PROCEDURE — 36415 COLL VENOUS BLD VENIPUNCTURE: CPT

## 2024-03-17 PROCEDURE — 87636 SARSCOV2 & INF A&B AMP PRB: CPT | Performed by: EMERGENCY MEDICINE

## 2024-03-17 PROCEDURE — 87040 BLOOD CULTURE FOR BACTERIA: CPT | Performed by: EMERGENCY MEDICINE

## 2024-03-17 PROCEDURE — 25510000001 IOPAMIDOL PER 1 ML: Performed by: EMERGENCY MEDICINE

## 2024-03-17 PROCEDURE — 71046 X-RAY EXAM CHEST 2 VIEWS: CPT

## 2024-03-17 PROCEDURE — 85025 COMPLETE CBC W/AUTO DIFF WBC: CPT | Performed by: EMERGENCY MEDICINE

## 2024-03-17 PROCEDURE — 83605 ASSAY OF LACTIC ACID: CPT | Performed by: EMERGENCY MEDICINE

## 2024-03-17 PROCEDURE — 93005 ELECTROCARDIOGRAM TRACING: CPT | Performed by: EMERGENCY MEDICINE

## 2024-03-17 PROCEDURE — 83880 ASSAY OF NATRIURETIC PEPTIDE: CPT | Performed by: EMERGENCY MEDICINE

## 2024-03-17 PROCEDURE — 85379 FIBRIN DEGRADATION QUANT: CPT | Performed by: EMERGENCY MEDICINE

## 2024-03-17 PROCEDURE — 80053 COMPREHEN METABOLIC PANEL: CPT | Performed by: EMERGENCY MEDICINE

## 2024-03-17 RX ORDER — SODIUM CHLORIDE 0.9 % (FLUSH) 0.9 %
10 SYRINGE (ML) INJECTION AS NEEDED
Status: DISCONTINUED | OUTPATIENT
Start: 2024-03-17 | End: 2024-03-17 | Stop reason: HOSPADM

## 2024-03-17 RX ADMIN — IOPAMIDOL 100 ML: 755 INJECTION, SOLUTION INTRAVENOUS at 13:47

## 2024-03-17 NOTE — ED NOTES
Pt came out to the desk and stated he wanted to leave. I explained to pt that we are waiting on the rest of his test results to come back. Pt was visibly upset, I explained to the pt that due to his labs that were abnormal, every test we were running today was necessary to his care. Pt wanted to know how much longer it was going to be. I told patient, I was unsure of the exact length of time, but hopefully within the next hour. Pt stated he needed to go home to his grandchild. I tried to explain to pt the importance of staying. Pt stated he still wanted to leave. Pt educated on the risk of leaving AMA. Pt signed AMA paperwork, IV was removed, and pt left the hospital.

## 2024-03-17 NOTE — ED PROVIDER NOTES
Subjective   History of Present Illness    Chief complaint: Shortness of air    Location: Home    Quality/Severity: Worse when he lays flat and exerts himself    Timing/Onset/Duration: Last night    Modifying Factors: Improved when he sits up    Associated Symptoms: No headache.  No fever chills or cough.  No sore throat.  No nasal congestion.  No chest pain.  No abdominal pain.  No diarrhea or burning when he urinates.  No nausea or vomiting.  No black or bloody stools.    Narrative: This 78-year-old white female presents with shortness of breath that started this morning.  Patient states that shortness of breath is gotten better after sitting up.  Patient has no complaints of chest pain.  The patient has an appointment with his kidney specialist tomorrow morning.  He wants to go home.  He is watching his 17-year-old granddaughter.    Review of Systems   Constitutional:  Negative for chills and fever.   HENT:  Negative for congestion, ear pain and sore throat.    Respiratory:  Positive for shortness of breath. Negative for cough.    Cardiovascular:  Negative for chest pain, palpitations and leg swelling.   Gastrointestinal:  Negative for abdominal pain, diarrhea, nausea and vomiting.   Genitourinary:  Negative for difficulty urinating.   Musculoskeletal:  Positive for back pain.   Skin:  Negative for rash.   Neurological:  Negative for headaches.         Past Medical History:   Diagnosis Date    Alcohol withdrawal     CHF (congestive heart failure)     Chronic pain     Coronary artery disease     Diabetes mellitus     Hypertension     Metabolic encephalopathy        No Known Allergies    History reviewed. No pertinent surgical history.    History reviewed. No pertinent family history.    Social History     Socioeconomic History    Marital status:    Tobacco Use    Smoking status: Former     Current packs/day: 0.00     Average packs/day: 1 pack/day for 25.0 years (25.0 ttl pk-yrs)     Types: Cigarettes      Start date: 1999     Quit date: 2024     Years since quittin.0    Smokeless tobacco: Never   Vaping Use    Vaping status: Never Used   Substance and Sexual Activity    Alcohol use: Not Currently     Comment: drinks 6-7 shots per day, vodka, fireball    Drug use: Yes     Types: Hydrocodone    Sexual activity: Defer           Objective   Physical Exam  Vitals (The temperature is 97.5 °F, pulse 112, respirations 24, /89, room air pulse ox 93%.) and nursing note reviewed.   Constitutional:       Appearance: He is well-developed.   HENT:      Head: Normocephalic and atraumatic.   Neck:      Vascular: No JVD.   Cardiovascular:      Rate and Rhythm: Normal rate and regular rhythm.      Heart sounds: No murmur heard.     No friction rub. No gallop.   Pulmonary:      Effort: Pulmonary effort is normal.      Breath sounds: Rales (Rales posterior inferior lung fields about a quarter of the way up.) present.   Chest:      Chest wall: No tenderness.   Abdominal:      General: Bowel sounds are normal.      Palpations: Abdomen is soft. There is no splenomegaly or mass.      Tenderness: There is no abdominal tenderness. There is no guarding or rebound.   Musculoskeletal:         General: Normal range of motion.      Cervical back: Normal range of motion and neck supple.      Right lower leg: No tenderness. Edema present.      Left lower leg: No tenderness. Edema present.   Skin:     General: Skin is warm and dry.      Findings: No rash.   Neurological:      Mental Status: He is alert and oriented to person, place, and time.         Procedures           ED Course  ED Course as of 24 1545   Sun Mar 17, 2024   1312 The lactic acid is 7.4 and elevated. [RC]   1312 The glucose is 113, the creatinine is 1.74, GFR 39, the CMP is otherwise unremarkable. [RC]   1313 The high-sensitivity troponin is 61 and elevated. [RC]   1313 The proBNP is 20,337 and elevated. [RC]   1313 The D-dimer is 2.22 and elevated. [RC]    1313 Sensitivity troponin is slightly lower today compared to 2 weeks ago. [RC]   1313 The proBNP 2 weeks ago was 9993, it has more than doubled today. [RC]   1314 2 weeks ago the D-dimer was 1.61.  It is increased today. [RC]   1542 2-hour troponin is 7 with a delta T of -4. [RC]   1542 The repeat lactic is 1.4. [RC]   1542 The procalcitonin is normal 1.05. [RC]   1542 The white blood cell count is 13, neutrophil percent 82%, neutrophil count is 10, CBC is otherwise unremarkable. [RC]      ED Course User Index  [RC] José Miguel Sparks MD      12:26 EDT, 03/17/24:  The EKG was obtained at 1225 and read by me at 1226.  The EKG shows a sinus tachycardia with rate of 109.  There is PAC.  There is left atrial enlargement.  There is a normal axis with no hypertrophy.  There is no ectopy.  There is no acute ST elevation.  There is abnormal T waves in the lateral leads.  There is artifact in multiple leads.  The EKG today compared to EKG dated 3/2/2024 is essentially unchanged.                             15:45 EDT, 03/17/24:  The patient left AMA before workup was complete.  The patient stated that he could not wait any longer.  Patient has an appointment with his nephrologist this coming week.          Medical Decision Making  Amount and/or Complexity of Data Reviewed  Labs: ordered.  Radiology: ordered.  ECG/medicine tests: ordered.    Risk  Prescription drug management.        Final diagnoses:   None       ED Disposition  ED Disposition       None            No follow-up provider specified.       Medication List      No changes were made to your prescriptions during this visit.            José Miguel Sparks MD  03/17/24 1600

## 2024-03-18 ENCOUNTER — HOSPITAL ENCOUNTER (EMERGENCY)
Facility: HOSPITAL | Age: 79
Discharge: HOME | End: 2024-03-18
Attending: EMERGENCY MEDICINE | Admitting: EMERGENCY MEDICINE
Payer: OTHER GOVERNMENT

## 2024-03-18 ENCOUNTER — APPOINTMENT (OUTPATIENT)
Dept: GENERAL RADIOLOGY | Facility: HOSPITAL | Age: 79
End: 2024-03-18
Payer: OTHER GOVERNMENT

## 2024-03-18 VITALS
DIASTOLIC BLOOD PRESSURE: 88 MMHG | HEART RATE: 113 BPM | HEIGHT: 71 IN | TEMPERATURE: 98.1 F | RESPIRATION RATE: 20 BRPM | SYSTOLIC BLOOD PRESSURE: 121 MMHG | BODY MASS INDEX: 26.88 KG/M2 | WEIGHT: 192 LBS | OXYGEN SATURATION: 95 %

## 2024-03-18 DIAGNOSIS — F41.0 PANIC ATTACKS: ICD-10-CM

## 2024-03-18 DIAGNOSIS — I50.9 CHRONIC CONGESTIVE HEART FAILURE, UNSPECIFIED HEART FAILURE TYPE: ICD-10-CM

## 2024-03-18 DIAGNOSIS — R06.00 DYSPNEA, UNSPECIFIED TYPE: Primary | ICD-10-CM

## 2024-03-18 LAB
ALBUMIN SERPL-MCNC: 4.3 G/DL (ref 3.5–5.2)
ALBUMIN/GLOB SERPL: 1.6 G/DL
ALP SERPL-CCNC: 92 U/L (ref 39–117)
ALT SERPL W P-5'-P-CCNC: 14 U/L (ref 1–41)
ANION GAP SERPL CALCULATED.3IONS-SCNC: 15.8 MMOL/L (ref 5–15)
AST SERPL-CCNC: 16 U/L (ref 1–40)
BASOPHILS # BLD AUTO: 0.13 10*3/MM3 (ref 0–0.2)
BASOPHILS NFR BLD AUTO: 1 % (ref 0–1.5)
BILIRUB SERPL-MCNC: 0.7 MG/DL (ref 0–1.2)
BUN SERPL-MCNC: 17 MG/DL (ref 8–23)
BUN/CREAT SERPL: 9.6 (ref 7–25)
CALCIUM SPEC-SCNC: 9.6 MG/DL (ref 8.6–10.5)
CHLORIDE SERPL-SCNC: 98 MMOL/L (ref 98–107)
CO2 SERPL-SCNC: 25.2 MMOL/L (ref 22–29)
CREAT SERPL-MCNC: 1.77 MG/DL (ref 0.76–1.27)
DEPRECATED RDW RBC AUTO: 49.1 FL (ref 37–54)
EGFRCR SERPLBLD CKD-EPI 2021: 38.8 ML/MIN/1.73
EOSINOPHIL # BLD AUTO: 0.03 10*3/MM3 (ref 0–0.4)
EOSINOPHIL NFR BLD AUTO: 0.2 % (ref 0.3–6.2)
ERYTHROCYTE [DISTWIDTH] IN BLOOD BY AUTOMATED COUNT: 19.9 % (ref 12.3–15.4)
GLOBULIN UR ELPH-MCNC: 2.7 GM/DL
GLUCOSE SERPL-MCNC: 190 MG/DL (ref 65–99)
HCT VFR BLD AUTO: 49.7 % (ref 37.5–51)
HGB BLD-MCNC: 14.8 G/DL (ref 13–17.7)
HOLD SPECIMEN: NORMAL
HOLD SPECIMEN: NORMAL
IMM GRANULOCYTES # BLD AUTO: 0.12 10*3/MM3 (ref 0–0.05)
IMM GRANULOCYTES NFR BLD AUTO: 0.9 % (ref 0–0.5)
LYMPHOCYTES # BLD AUTO: 0.9 10*3/MM3 (ref 0.7–3.1)
LYMPHOCYTES NFR BLD AUTO: 6.7 % (ref 19.6–45.3)
MCH RBC QN AUTO: 22.2 PG (ref 26.6–33)
MCHC RBC AUTO-ENTMCNC: 29.8 G/DL (ref 31.5–35.7)
MCV RBC AUTO: 74.6 FL (ref 79–97)
MONOCYTES # BLD AUTO: 0.8 10*3/MM3 (ref 0.1–0.9)
MONOCYTES NFR BLD AUTO: 6 % (ref 5–12)
NEUTROPHILS NFR BLD AUTO: 11.36 10*3/MM3 (ref 1.7–7)
NEUTROPHILS NFR BLD AUTO: 85.2 % (ref 42.7–76)
NRBC BLD AUTO-RTO: 0 /100 WBC (ref 0–0.2)
NT-PROBNP SERPL-MCNC: ABNORMAL PG/ML (ref 0–1800)
PLAT MORPH BLD: NORMAL
PLATELET # BLD AUTO: 386 10*3/MM3 (ref 140–450)
PMV BLD AUTO: 11.3 FL (ref 6–12)
POTASSIUM SERPL-SCNC: 4.5 MMOL/L (ref 3.5–5.2)
PROT SERPL-MCNC: 7 G/DL (ref 6–8.5)
QT INTERVAL: 344 MS
QT INTERVAL: 356 MS
QTC INTERVAL: 481 MS
QTC INTERVAL: 486 MS
RBC # BLD AUTO: 6.66 10*6/MM3 (ref 4.14–5.8)
RBC MORPH BLD: NORMAL
SODIUM SERPL-SCNC: 139 MMOL/L (ref 136–145)
TROPONIN T SERPL HS-MCNC: 64 NG/L
WBC MORPH BLD: NORMAL
WBC NRBC COR # BLD AUTO: 13.34 10*3/MM3 (ref 3.4–10.8)
WHOLE BLOOD HOLD COAG: NORMAL
WHOLE BLOOD HOLD SPECIMEN: NORMAL

## 2024-03-18 PROCEDURE — 99284 EMERGENCY DEPT VISIT MOD MDM: CPT

## 2024-03-18 PROCEDURE — 84484 ASSAY OF TROPONIN QUANT: CPT | Performed by: EMERGENCY MEDICINE

## 2024-03-18 PROCEDURE — 93005 ELECTROCARDIOGRAM TRACING: CPT

## 2024-03-18 PROCEDURE — 83880 ASSAY OF NATRIURETIC PEPTIDE: CPT | Performed by: EMERGENCY MEDICINE

## 2024-03-18 PROCEDURE — 71046 X-RAY EXAM CHEST 2 VIEWS: CPT

## 2024-03-18 PROCEDURE — 80053 COMPREHEN METABOLIC PANEL: CPT | Performed by: EMERGENCY MEDICINE

## 2024-03-18 PROCEDURE — 93005 ELECTROCARDIOGRAM TRACING: CPT | Performed by: EMERGENCY MEDICINE

## 2024-03-18 PROCEDURE — 93010 ELECTROCARDIOGRAM REPORT: CPT | Performed by: INTERNAL MEDICINE

## 2024-03-18 PROCEDURE — 85025 COMPLETE CBC W/AUTO DIFF WBC: CPT | Performed by: EMERGENCY MEDICINE

## 2024-03-18 PROCEDURE — 85007 BL SMEAR W/DIFF WBC COUNT: CPT | Performed by: EMERGENCY MEDICINE

## 2024-03-18 RX ORDER — HYDROCHLOROTHIAZIDE 25 MG/1
25 TABLET ORAL DAILY
Qty: 30 TABLET | Refills: 0 | Status: SHIPPED | OUTPATIENT
Start: 2024-03-18

## 2024-03-18 RX ORDER — SODIUM CHLORIDE 0.9 % (FLUSH) 0.9 %
10 SYRINGE (ML) INJECTION AS NEEDED
Status: DISCONTINUED | OUTPATIENT
Start: 2024-03-18 | End: 2024-03-18 | Stop reason: HOSPADM

## 2024-03-18 NOTE — ED PROVIDER NOTES
"Subjective     History provided by:  Patient and medical records    History of Present Illness    Chief complaint: Shortness of breath    Location: Lungs    Quality/Severity: The patient states he has been short of breath for about 2 weeks.    Timing/Onset: 2 weeks    Modifying Factors: He states he panics when he tries to go to sleep and feels short of breath.  When he is up walking around the shortness of breath resolves.    Associated symptoms: Denies a cough.  Denies chest pain.  Denies swelling of his feet and legs.  Denies a runny nose.    Narrative: The patient is a 78-year-old white male complaining of a 2-week history of intermittent shortness of breath.  He states he panics when he is at home and going to sleep stating \"I quit breathing\".  He states when he is up walking around and exerting himself the shortness of breath goes away.  The patient was seen here in this emergency department yesterday where workup with a CT angiogram of chest x-ray showed small bilateral pleural effusions and mild interstitial edema.  No pulmonary embolus.  His high sensitive troponin was at baseline at 61 and 57.  His GFR was baseline 39.6 consistent with chronic kidney disease.  COVID and flu were negative.  CBC had an elevated white count of 13.3 which was baseline for the patient.  His proBNP was elevated at 20,337.  The patient left AMA before his test were back.  He tells me that at night he panics when he tries to go to sleep because he is afraid to quit breathing.  He states he is better when he is up and walking around.  He denies any swelling in his feet.  Denies any chest pain.  The patient was admitted here 2/27 - 3/1/2024 and was noted to have congestive heart failure but was not prescribed any diuretics.  He is normally followed at the [facility].    Review of Systems  Past Medical History:   Diagnosis Date    Alcohol withdrawal     CHF (congestive heart failure)     Chronic pain     Coronary artery " "disease     Diabetes mellitus     Hypertension     Metabolic encephalopathy      /88   Pulse 113   Temp 98.1 °F (36.7 °C) (Oral)   Resp 20   Ht 180.3 cm (71\")   Wt 87.1 kg (192 lb)   SpO2 95%   BMI 26.78 kg/m²     Past Medical History:   Diagnosis Date    Alcohol withdrawal     CHF (congestive heart failure)     Chronic pain     Coronary artery disease     Diabetes mellitus     Hypertension     Metabolic encephalopathy        The primary encounter diagnosis was Dyspnea, unspecified type. Diagnoses of Panic attacks and Chronic congestive heart failure, unspecified heart failure type were also pertinent to this visit.  XR Chest 2 View   Final Result   Impression:      1. No acute cardiopulmonary disease.         Electronically Signed: Arnol Nguyen MD     3/18/2024 12:39 PM EDT     Workstation ID: [id number]        Final diagnoses:   Dyspnea, unspecified type   Panic attacks   Chronic congestive heart failure, unspecified heart failure type     No Known Allergies    History reviewed. No pertinent surgical history.    History reviewed. No pertinent family history.    Social History     Socioeconomic History    Marital status:    Tobacco Use    Smoking status: Former     Current packs/day: 0.00     Average packs/day: 1 pack/day for 25.0 years (25.0 ttl pk-yrs)     Types: Cigarettes     Start date: 1999     Quit date: 2024     Years since quittin.0    Smokeless tobacco: Never   Vaping Use    Vaping status: Never Used   Substance and Sexual Activity    Alcohol use: Not Currently     Comment: drinks 6-7 shots per day, vodka, fireball    Drug use: Yes     Types: Hydrocodone    Sexual activity: Defer           Objective   Physical Exam  Vitals and nursing note reviewed.   Constitutional:       General: He is not in acute distress.     Appearance: He is well-developed. He is not ill-appearing, toxic-appearing or diaphoretic.      Comments: The patient appears healthy in no acute distress.  " Review of his vital signs: He is afebrile with a temperature 98.1 respirations are 20 with a room air oxygen saturation of 98% when I was in the room, little tachycardic with a heart rate of 118, blood pressure elevated 133/105.   HENT:      Head: Normocephalic and atraumatic.      Nose: Nose normal.      Mouth/Throat:      Pharynx: No oropharyngeal exudate.   Eyes:      General: No scleral icterus.        Right eye: No discharge.         Left eye: No discharge.      Pupils: Pupils are equal, round, and reactive to light.   Neck:      Thyroid: No thyromegaly.      Vascular: No JVD.   Cardiovascular:      Rate and Rhythm: Normal rate and regular rhythm.      Heart sounds: Normal heart sounds. No murmur heard.  Pulmonary:      Effort: Pulmonary effort is normal.      Breath sounds: Normal breath sounds. No wheezing, rhonchi or rales.   Chest:      Chest wall: No tenderness.   Abdominal:      General: Bowel sounds are normal. There is no distension.      Palpations: Abdomen is soft.      Tenderness: There is no abdominal tenderness.   Musculoskeletal:         General: No tenderness or deformity. Normal range of motion.      Cervical back: Normal range of motion and neck supple.      Right lower leg: No tenderness. No edema.      Left lower leg: No tenderness. No edema.   Lymphadenopathy:      Cervical: No cervical adenopathy.   Skin:     General: Skin is warm and dry.      Capillary Refill: Capillary refill takes less than 2 seconds.      Findings: No rash.   Neurological:      General: No focal deficit present.      Mental Status: He is alert and oriented to person, place, and time.      Cranial Nerves: No cranial nerve deficit.      Coordination: Coordination normal.      Comments: No focal motor sensory deficit   Psychiatric:         Mood and Affect: Mood normal.         Behavior: Behavior normal.         Thought Content: Thought content normal.         Judgment: Judgment normal.         Procedures           ED  Course  ED Course as of 03/18/24 1418   Mon Mar 18, 2024   1246 The patient's workup yesterday was with a negative CT angiogram.  His proBNP was elevated at 20,337.  His lungs are clear to auscultation and he has no edema.  His chest x-ray and CT angiogram showed mild interstitial edema.  I cannot find the patient to be currently prescribed a diuretic, so I will prescribe him hydrochlorothiazide 25 mg.  Is my impression the patient has a panic component to shortness of breath as he states he wakes up from sleep short of breath.  His shortness of breath improves with exertion.  He was instructed to follow-up with the [facility]. [TP]   1415 Review the patient's labs today: His sinuses troponin is 64 which is unchanged from yesterday.  CMP has an elevated glucose of 190 with a elevated creatinine of 1.77 and a GFR of 38.8 consistent with chronic kidney disease unchanged from yesterday.  Sodium potassium and liver enzymes within normal limits.  CBC has a white count of 13.34 with a left shift that is unchanged from yesterday and unchanged from baseline.  Hemoglobin, hematocrit and platelets within normal limits. [TP]   1416 The patient's chest x-ray today was interpreted by me and the radiologist as no acute disease. [TP]   1416 My interpretation of the patient's EKG tracing performed at 12: 00 with sinus tachycardia with a rate of 120, normal axis, normal conduction, no acute ST segment elevation or depression consistent with ischemia, no ectopy, probable left atrial enlargement. [TP]      ED Course User Index  [TP] Porfirio Telles MD                                             Medical Decision Making  My differential diagnosis for dyspnea includes but is not limited to:  Asthma, COPD, pneumonia, pulmonary embolus, acute respiratory distress syndrome, pneumothorax, pleural effusion, pulmonary fibrosis, congestive heart failure, myocardial infarction, DKA, uremia, acidosis, sepsis, anemia, drug related,  hyperventilation, CNS disease     Problems Addressed:  Chronic congestive heart failure, unspecified heart failure type: complicated acute illness or injury  Dyspnea, unspecified type: complicated acute illness or injury  Panic attacks: complicated acute illness or injury    Amount and/or Complexity of Data Reviewed  Labs: ordered. Decision-making details documented in ED Course.  Radiology: ordered. Decision-making details documented in ED Course.  ECG/medicine tests: ordered and independent interpretation performed. Decision-making details documented in ED Course.    Risk  Prescription drug management.        Final diagnoses:   Dyspnea, unspecified type   Panic attacks   Chronic congestive heart failure, unspecified heart failure type       ED Disposition  ED Disposition       ED Disposition   Discharge    Condition   Stable    Comment   --               [facility].    Schedule an appointment as soon as possible for a visit   next available         Medication List        New Prescriptions      hydroCHLOROthiazide 25 MG tablet  Take 1 tablet by mouth Daily.               Where to Get Your Medications        These medications were sent to [facility]/pharmacy #[id number] - [place], [place] - [address] - [phone]  - [phone]   [address] [address]      Phone: [phone]   hydroCHLOROthiazide 25 MG tablet           Labs Reviewed   COMPREHENSIVE METABOLIC PANEL - Abnormal; Notable for the following components:       Result Value    Glucose 190 (*)     Creatinine 1.77 (*)     Anion Gap 15.8 (*)     eGFR 38.8 (*)     All other components within normal limits    Narrative:     GFR Normal >60  Chronic Kidney Disease <60  Kidney Failure <15    The GFR formula is only valid for adults with stable renal function between ages 18 and 70.   SINGLE HSTROPONIN T - Abnormal; Notable for the following components:    HS Troponin T 64 (*)     All other components within normal  limits    Narrative:     High Sensitive Troponin T Reference Range:  <14.0 ng/L- Negative Female for AMI  <22.0 ng/L- Negative Male for AMI  >=14 - Abnormal Female indicating possible myocardial injury.  >=22 - Abnormal Male indicating possible myocardial injury.   Clinicians would have to utilize clinical acumen, EKG, Troponin, and serial changes to determine if it is an Acute Myocardial Infarction or myocardial injury due to an underlying chronic condition.        CBC WITH AUTO DIFFERENTIAL - Abnormal; Notable for the following components:    WBC 13.34 (*)     RBC 6.66 (*)     MCV 74.6 (*)     MCH 22.2 (*)     MCHC 29.8 (*)     RDW 19.9 (*)     Neutrophil % 85.2 (*)     Lymphocyte % 6.7 (*)     Eosinophil % 0.2 (*)     Immature Grans % 0.9 (*)     Neutrophils, Absolute 11.36 (*)     Immature Grans, Absolute 0.12 (*)     All other components within normal limits   SCAN SLIDE - Normal   RAINBOW DRAW    Narrative:     The following orders were created for panel order Marydel Draw.  Procedure                               Abnormality         Status                     ---------                               -----------         ------                     Green Top (Gel)[[id number]]                                  Final result               Lavender Top[[id number]]                                     Final result               Gold Top - SST[[id number]]                                   Final result               Light Blue Top[[id number]]                                   Final result                 Please view results for these tests on the individual orders.   BNP (IN-HOUSE)   HIGH SENSITIVITIY TROPONIN T 2HR   CBC AND DIFFERENTIAL    Narrative:     The following orders were created for panel order CBC & Differential.  Procedure                               Abnormality         Status                     ---------                               -----------         ------                     CBC Auto  Differential[[id number]]        Abnormal            Final result               Scan Slide[[id number]]                   Normal              Final result                 Please view results for these tests on the individual orders.   GREEN TOP   LAVENDER TOP   GOLD TOP - SST   LIGHT BLUE TOP     XR Chest 2 View   Final Result   Impression:      1. No acute cardiopulmonary disease.         Electronically Signed: Arnol Nguyen MD     3/18/2024 12:39 PM EDT     Workstation ID: [id number]             Medication List        New Prescriptions      hydroCHLOROthiazide 25 MG tablet  Take 1 tablet by mouth Daily.               Where to Get Your Medications        These medications were sent to [facility]/pharmacy #[id number] - [place], [place] - [address] [phone]  - [phone] [address] [address]      Phone: [phone]   hydroCHLOROthiazide 25 MG tablet              Porfirio Telles MD  03/18/24 6335

## 2024-03-22 LAB
BACTERIA SPEC AEROBE CULT: NORMAL
BACTERIA SPEC AEROBE CULT: NORMAL

## 2024-05-12 ENCOUNTER — HOSPITAL ENCOUNTER (EMERGENCY)
Facility: HOSPITAL | Age: 79
Discharge: HOME | End: 2024-05-13
Attending: EMERGENCY MEDICINE | Admitting: EMERGENCY MEDICINE
Payer: OTHER GOVERNMENT

## 2024-05-12 DIAGNOSIS — R00.0 TACHYCARDIA: Primary | ICD-10-CM

## 2024-05-12 PROCEDURE — 99283 EMERGENCY DEPT VISIT LOW MDM: CPT

## 2024-05-12 PROCEDURE — 93005 ELECTROCARDIOGRAM TRACING: CPT | Performed by: EMERGENCY MEDICINE

## 2024-05-13 VITALS
DIASTOLIC BLOOD PRESSURE: 89 MMHG | TEMPERATURE: 98.3 F | SYSTOLIC BLOOD PRESSURE: 108 MMHG | WEIGHT: 180 LBS | OXYGEN SATURATION: 93 % | HEIGHT: 71 IN | HEART RATE: 111 BPM | RESPIRATION RATE: 20 BRPM | BODY MASS INDEX: 25.2 KG/M2

## 2024-05-13 LAB
QT INTERVAL: 341 MS
QTC INTERVAL: 480 MS

## 2024-05-13 NOTE — ED NOTES
Pt arrived with a dual lumen tunelled CVC to right upper chest. Site is bruised ( old yellow/ green bruising ). Pt has a milrinone continuous IV infusion through one of the ports. Home health is following patient at home and change the bag this afternoon at 1500, Drip rate is infusing @ 0.25 mcg/kg/min ( 5.8 ml/hr )    Calcipotriene Pregnancy And Lactation Text: This medication has not been proven safe during pregnancy. It is unknown if this medication is excreted in breast milk.

## 2024-05-13 NOTE — ED PROVIDER NOTES
Subjective   History of Present Illness  Patient presents with concerns that his heart rate has become elevated.  Patient was recently admitted and discharged for her heart failure (see below).  During the stay patient had a long-term IV line placed and started on a milrinone drip.  Patient unsure when his next follow-up is.  Patient came in tonight because the pump was messing up and patient's heart rate was increasing.  Patient says all started around 3 PM today and got worse as the night went on.  Patient reports some dizziness with standing that was very brief and was not persistent.      Admit Date        05/03/2024 16:32        Discharge Date:   _5/10/24    Attending   _Nikhil    Service/Team   _[facility]    Consulting Physicians   _Ct surgery    Primary Discharge Diagnosis   _Heart failure    Secondary Diagnosis(es)   _RHC  Saturations   SVC: 54%   PA: 50%   Pressures   RA: 12   RV: 54/14   PA: 56/32 (42)   PAWP: 19   Cardiac output/index   Gurpreet: 3.05/1.5   TD: 2.41/1.2   Pulmonary vascular resistance   Gurpreet: 7.5 WUTD: 9.5 OR[place]O      Discharge Follow Up        Follow up with primary care provider - Within As needed        Follow Up Information/Tasks:   _[facility]      Code Status       Start: 05/06/24 17:34:00 EDT, Full Code, Continuous Order    Brief Admission HPI/Presentation:   _Patient is a 78-year-old man with past medical history of hypertension, hyperlipidemia, hypothyroidism, DVT of lower extremity, diabetes mellitus, tobacco use disorder, history of prostate cancer status post surgery, basal cell carcinoma, CAD/STEMI s/p PCI to RCA and LAD in 2022, [facility], who has been [facility] for home milrinone set up. He presents on 0.25 mcg/kg/min. He has no complaints at the moment    Hospital Course       Patient remained inpatient pending milrinone set up. Patient received RHC with CI on 1.5. Patient was continued on palliative milrinone at 0.25. Discharged home after it was set up  on 5/10/24    Significant Findings   _    Procedures and Treatment Provided   SN - Proc - Procedure: CL Right Heart Catheterization SN (24 04:58:53)      Review of Systems   All other systems reviewed and are negative.      Past Medical History:   Diagnosis Date    Alcohol withdrawal     CHF (congestive heart failure)     Chronic pain     Coronary artery disease     Diabetes mellitus     Hypertension     Metabolic encephalopathy     Myocardial infarction     Panic attacks        No Known Allergies    Past Surgical History:   Procedure Laterality Date    BACK SURGERY      CORONARY ANGIOPLASTY WITH STENT PLACEMENT      2 stents    HERNIA REPAIR      PROSTATECTOMY      SKIN CANCER EXCISION         History reviewed. No pertinent family history.    Social History     Socioeconomic History    Marital status:    Tobacco Use    Smoking status: Former     Current packs/day: 0.00     Average packs/day: 1 pack/day for 25.0 years (25.0 ttl pk-yrs)     Types: Cigarettes     Start date: 1999     Quit date: 2024     Years since quittin.2    Smokeless tobacco: Never   Vaping Use    Vaping status: Never Used   Substance and Sexual Activity    Alcohol use: Not Currently     Comment: drinks 6-7 shots per day, vodka, fireball    Drug use: Yes     Types: Hydrocodone    Sexual activity: Defer           Objective   Physical Exam  Vitals and nursing note reviewed.   HENT:      Head: Normocephalic.      Nose: Nose normal.      Mouth/Throat:      Pharynx: Oropharynx is clear.   Eyes:      General:         Right eye: No discharge.      Conjunctiva/sclera: Conjunctivae normal.   Cardiovascular:      Rate and Rhythm: Regular rhythm. Tachycardia present.      Pulses:           Radial pulses are 2+ on the right side and 2+ on the left side.      Heart sounds: Normal heart sounds, S1 normal and S2 normal.   Pulmonary:      Effort: Pulmonary effort is normal.      Breath sounds: Normal breath sounds.   Chest:           Comments: IV line at circled area is in good position, no surrounding erythema, or swelling.  Abdominal:      General: Abdomen is flat.      Palpations: Abdomen is soft.   Musculoskeletal:      Cervical back: Normal range of motion and neck supple.      Right lower leg: No edema.      Left lower leg: No edema.   Skin:     General: Skin is warm and dry.      Capillary Refill: Capillary refill takes 2 to 3 seconds.   Neurological:      Mental Status: He is alert and oriented to person, place, and time.   Psychiatric:         Mood and Affect: Mood normal.         Behavior: Behavior normal.         Procedures           ED Course  ED Course as of 05/13/24 0222   Sun May 12, 2024   2326 EKG-rate of 119, sinus tachycardia, normal axis, nonspecific T wave abnormalities seen in lateral leads.  When compared to EKG from 5/5/2024 is generally unchanged. [AW]      ED Course User Index  [AW] Jeffry Buck MD                                             Medical Decision Making  Ddx pneumonia, COPD, chest wall pain, strain, sprain, pneumothorax, costochondritis, dysrhythmia    0025 Pt seen again prior to d/c.  Labs/Imaging reviewed and are unremarkable.  Symptoms improved and pt feels better, vitals stable and pt. in NAD. Non-toxic. Comfortable. Ambulating without difficulty.  Tolerating po.  Relaxed breathing.  All questions personally answered at the bedside and all d/c instructions personally reviewed with pt.  Discussed the importance of close outpt. f/u and pt. understands this and agrees to do so.  Pt agrees to return to ED immediately for any new, persistent, or worsening symptoms.    EMR Dragon/Transcription disclaimer:  Much of this encounter note is an electronic transcription/translation of spoken language to printed text, aka voice recognition.  The electronic translation of spoken language may permit erroneous or at times nonsensical words or phrases to be inadvertently transcribed; although I have reviewed the  note for such errors, some may still exist so please interpret based on surrounding text content.      Problems Addressed:  Tachycardia: acute illness or injury    Amount and/or Complexity of Data Reviewed  ECG/medicine tests: ordered.        Final diagnoses:   Tachycardia       ED Disposition  ED Disposition       ED Disposition   Discharge    Condition   Stable    Comment   --               Jese Husain MD  [address]  [place] [address]  [phone]    In 1 week           Medication List      No changes were made to your prescriptions during this visit.            Jeffry Buck MD  05/13/24 0224

## 2024-05-14 ENCOUNTER — HOSPITAL ENCOUNTER (EMERGENCY)
Facility: HOSPITAL | Age: 79
Discharge: HOME | End: 2024-05-14
Attending: STUDENT IN AN ORGANIZED HEALTH CARE EDUCATION/TRAINING PROGRAM
Payer: OTHER GOVERNMENT

## 2024-05-14 VITALS
DIASTOLIC BLOOD PRESSURE: 81 MMHG | BODY MASS INDEX: 25.2 KG/M2 | WEIGHT: 180 LBS | SYSTOLIC BLOOD PRESSURE: 113 MMHG | HEART RATE: 113 BPM | OXYGEN SATURATION: 97 % | RESPIRATION RATE: 18 BRPM | HEIGHT: 71 IN | TEMPERATURE: 98.1 F

## 2024-05-14 DIAGNOSIS — Z45.1 ENCOUNTER FOR ADJUSTMENT AND MANAGEMENT OF INFUSION PUMP: Primary | ICD-10-CM

## 2024-05-14 LAB — GLUCOSE BLDC GLUCOMTR-MCNC: 151 MG/DL (ref 70–130)

## 2024-05-14 PROCEDURE — 82948 REAGENT STRIP/BLOOD GLUCOSE: CPT

## 2024-05-14 PROCEDURE — 99283 EMERGENCY DEPT VISIT LOW MDM: CPT

## 2024-05-14 NOTE — ED PROVIDER NOTES
"Chief Complaint   Patient presents with     Well Child       Initial /70  Pulse 99  Temp 97.3  F (36.3  C) (Tympanic)  Ht 5' 1\" (1.549 m)  Wt 108 lb 9.6 oz (49.3 kg)  SpO2 100%  BMI 20.52 kg/m2 Estimated body mass index is 20.52 kg/(m^2) as calculated from the following:    Height as of this encounter: 5' 1\" (1.549 m).    Weight as of this encounter: 108 lb 9.6 oz (49.3 kg).  Medication Reconciliation: complete  " "Subjective   History of Present Illness  This is a very pleasant 78-year-old male who presents to the emergency department with concern for his pump malfunctioning.  Patient is on a milrinone pump from [facility] stating that the pump stopped working earlier this evening and was \"just beeping like crazy.\"  The patient's pump stopped twice tonight and the ambulance crew came out and evaluated the pump.  There was a kink in the wire.  This was corrected and the patient has been having no functioning issues with the pump since that time.  The patient however presents for further evaluation.  He has no other symptoms, new symptoms, change, nausea, vomiting, chest pressure, chest pain, syncope, back pain or weakness.  He states that the pump has been now working.  He uses D5 to pump the milrinone and would like us to check his blood sugar as well.      Review of Systems   Constitutional:  Negative for activity change, chills, diaphoresis and fever.   HENT: Negative.     Respiratory: Negative.     Cardiovascular: Negative.    Skin: Negative.    Neurological: Negative.    All other systems reviewed and are negative.      Past Medical History:   Diagnosis Date    Alcohol withdrawal     CHF (congestive heart failure)     Chronic pain     Coronary artery disease     Diabetes mellitus     Hypertension     Metabolic encephalopathy     Myocardial infarction     Panic attacks        Allergies   Allergen Reactions    Gemfibrozil Other (See Comments)    Simvastatin Other (See Comments)       Past Surgical History:   Procedure Laterality Date    BACK SURGERY      CORONARY ANGIOPLASTY WITH STENT PLACEMENT      2 stents    HERNIA REPAIR      PROSTATECTOMY      SKIN CANCER EXCISION         History reviewed. No pertinent family history.    Social History     Socioeconomic History    Marital status:    Tobacco Use    Smoking status: Former     Current packs/day: 0.00     Average packs/day: 1 pack/day for 25.0 years (25.0 ttl " pk-yrs)     Types: Cigarettes     Start date: 1999     Quit date: 2024     Years since quittin.2    Smokeless tobacco: Never   Vaping Use    Vaping status: Never Used   Substance and Sexual Activity    Alcohol use: Not Currently     Comment: drinks 6-7 shots per day, vodka, fireball    Drug use: Yes     Types: Hydrocodone    Sexual activity: Defer           Objective   Physical Exam  Vitals and nursing note reviewed.   Constitutional:       Appearance: Normal appearance. He is normal weight. He is not ill-appearing, toxic-appearing or diaphoretic.   HENT:      Head: Normocephalic and atraumatic.      Right Ear: External ear normal.      Left Ear: External ear normal.      Nose: Nose normal. No congestion or rhinorrhea.      Mouth/Throat:      Pharynx: No oropharyngeal exudate or posterior oropharyngeal erythema.   Eyes:      Extraocular Movements: Extraocular movements intact.      Conjunctiva/sclera: Conjunctivae normal.      Pupils: Pupils are equal, round, and reactive to light.   Cardiovascular:      Rate and Rhythm: Regular rhythm. Tachycardia present.      Pulses: Normal pulses.   Pulmonary:      Effort: Pulmonary effort is normal.      Breath sounds: Normal breath sounds. No stridor. No wheezing, rhonchi or rales.   Chest:      Chest wall: No tenderness.   Abdominal:      General: There is no distension.      Palpations: Abdomen is soft. There is no mass.   Musculoskeletal:         General: No swelling, tenderness or signs of injury. Normal range of motion.      Cervical back: Normal range of motion. No rigidity or tenderness.   Skin:     General: Skin is warm.      Capillary Refill: Capillary refill takes less than 2 seconds.      Coloration: Skin is not jaundiced or pale.      Findings: No bruising.   Neurological:      General: No focal deficit present.      Mental Status: He is alert and oriented to person, place, and time. Mental status is at baseline.      Motor: No weakness.          Procedures           ED Course                                             Medical Decision Making    MDM:    Escalation of care including admission/observation considered    - Discussions of management with other providers:  None    - Discussed/reviewed with Radiology regarding test interpretation    - Independent interpretation: Labs    - Additional patient history obtained from: None    - Review of external non-ED record (if available):  Prior Inpt record, Office record, Outpt record, Prior Outpt labs, PCP record, Outside ED record, Other    - Chronic conditions affecting care: See HPI and medical Hx.    - Social Determinants of health significantly affecting care:  None        Medical Decision Making Discussion:    The patient was initially tachycardic at about 122 bpm.  This appears to be a longstanding issue for him he has been seen, multiple times in this emergency department.  He was seen on 3/17, 3/18, 5/12.  5/12/2024, the patient's EKG showed a rate of 119 with no significant evidence of abnormality.  He also had pump issues at that time and he feels that his heart rate was increasing.  Patient does have a history of chronic systolic congestive heart failure.  He has a CT angiogram from 3/17/2024, lungs were clear, he does have a small amount of anxiety concerning his health at baseline.    After a very lengthy discussion and evaluation of the patient, I have offered him labs, EKG and further workup however he states that he does not want this right now he just wants to watch his pump to make sure that it is okay.  I feel that this is a reasonable approach.  The patient has no other symptoms and is mostly concerned about the pump.  We will just monitor the patient in the emergency department and see how the pump progresses however the pump appears to be working just fine, there is no evidence of abnormality and while his initial heart rate is 122, he was mildly agitated when discussing his prior  admission to the hospital at [facility] and I feel that this may have been situational, I will continue to monitor.  He has no active chest pain and no syncope.    04:49-patient's heart rate has improved significantly to a rate of 113 bpm.  He continues to have no chest pain.  Pump seems to be working appropriately, he is resting well in bed and continues to improve.  He again maintains he does not want any further workup or evaluation, just to monitor his pump.  I feel this is again reasonable and the patient will continue to be monitored.  He will be able to call his granddaughter for a ride home.  I will monitor for another 30 to 45 minutes and to the point continue to work, I see no need to keep the patient to the emergency department.  He is agreeable to this.     05:42-patient continues to improve, he is comfortable, resting, he has no evidence of pump abnormality.  The pump seems to be pumping well and he has not had any alarms.  The patient's heart rate continues to downtrend to the low 1 teens.  As low as about 108 bpm.  I do believe that he does have a baseline tachycardia based off of his chart review.  He continues to have no other symptoms or pain.  Per my repeated evaluations, the patient does not require any further workup and he does not want any further evaluation with needles or imaging studies.  He has been observed here, I see no evidence of acute abnormality or emergent process ongoing.  In the setting of no labs or imaging studies, I believe the patient is hemodynamically stable and well.  He will be discharged at this time with follow-up on an outpatient basis.  He is agreeable to this.    The patient has been given very strict return precautions to return to the emergency department should there be any acute change or worsening of their condition.  I have explained my findings and the patient has expressed understanding to me.  I explained that the work-up performed in the ED has been based on  the specific complaint and concern, as the nature of emergency medicine is complaint driven and they understand that new symptoms may arise.  I have told them that, should there be any new symptoms, worsening or changing symptoms, a new work-up may be indicated that they are encouraged to return to the emergency department or promptly contact their primary care physician. We have employed a shared decision-making process as the discussion of their disposition.  The patient has been educated as to the nature of the visit, the tests and work-up performed and the findings from today's visit. At this time, there does not appear to be any acute emergent process that necessitates admission to the hospital, however, the patient understands that this can change unexpectedly. At this time, the patient is stable for discharge home and agrees to follow-up with her primary care physician in the next 24 to 48 hours or earlier should they be able to obtain an appointment.    The patient was counseled regarding diagnostic results and treatment plan and patient has indicated understanding of these instructions.      Problems Addressed:  Encounter for adjustment and management of infusion pump: acute illness or injury        Final diagnoses:   Encounter for adjustment and management of infusion pump       ED Disposition  ED Disposition       ED Disposition   Discharge    Condition   Good    Comment   --               Provider, No Known  [facility] [id number] [phone]               Medication List      No changes were made to your prescriptions during this visit.            Fermin Meng DO  05/14/24 0589

## 2024-05-21 ENCOUNTER — LAB REQUISITION (OUTPATIENT)
Dept: LAB | Facility: HOSPITAL | Age: 79
End: 2024-05-21

## 2024-05-21 DIAGNOSIS — I50.9 HEART FAILURE, UNSPECIFIED: ICD-10-CM

## 2024-05-21 LAB
ALBUMIN SERPL-MCNC: 4.1 G/DL (ref 3.5–5.2)
ALBUMIN/GLOB SERPL: 2.1 G/DL
ALP SERPL-CCNC: 111 U/L (ref 39–117)
ALT SERPL W P-5'-P-CCNC: 21 U/L (ref 1–41)
ANION GAP SERPL CALCULATED.3IONS-SCNC: 14.6 MMOL/L (ref 5–15)
AST SERPL-CCNC: 19 U/L (ref 1–40)
BASOPHILS # BLD AUTO: 0.1 10*3/MM3 (ref 0–0.2)
BASOPHILS NFR BLD AUTO: 1.1 % (ref 0–1.5)
BILIRUB SERPL-MCNC: 0.6 MG/DL (ref 0–1.2)
BUN SERPL-MCNC: 34 MG/DL (ref 8–23)
BUN/CREAT SERPL: 20.4 (ref 7–25)
CALCIUM SPEC-SCNC: 9 MG/DL (ref 8.6–10.5)
CHLORIDE SERPL-SCNC: 101 MMOL/L (ref 98–107)
CO2 SERPL-SCNC: 25.4 MMOL/L (ref 22–29)
CREAT SERPL-MCNC: 1.67 MG/DL (ref 0.76–1.27)
DEPRECATED RDW RBC AUTO: 52.8 FL (ref 37–54)
EGFRCR SERPLBLD CKD-EPI 2021: 41.6 ML/MIN/1.73
EOSINOPHIL # BLD AUTO: 0.08 10*3/MM3 (ref 0–0.4)
EOSINOPHIL NFR BLD AUTO: 0.9 % (ref 0.3–6.2)
ERYTHROCYTE [DISTWIDTH] IN BLOOD BY AUTOMATED COUNT: 20.3 % (ref 12.3–15.4)
GLOBULIN UR ELPH-MCNC: 2 GM/DL
GLUCOSE SERPL-MCNC: 140 MG/DL (ref 65–99)
HCT VFR BLD AUTO: 41 % (ref 37.5–51)
HGB BLD-MCNC: 12.3 G/DL (ref 13–17.7)
IMM GRANULOCYTES # BLD AUTO: 0.09 10*3/MM3 (ref 0–0.05)
IMM GRANULOCYTES NFR BLD AUTO: 1 % (ref 0–0.5)
LYMPHOCYTES # BLD AUTO: 0.99 10*3/MM3 (ref 0.7–3.1)
LYMPHOCYTES NFR BLD AUTO: 10.6 % (ref 19.6–45.3)
MAGNESIUM SERPL-MCNC: 1.3 MG/DL (ref 1.6–2.4)
MCH RBC QN AUTO: 22.9 PG (ref 26.6–33)
MCHC RBC AUTO-ENTMCNC: 30 G/DL (ref 31.5–35.7)
MCV RBC AUTO: 76.2 FL (ref 79–97)
MONOCYTES # BLD AUTO: 0.59 10*3/MM3 (ref 0.1–0.9)
MONOCYTES NFR BLD AUTO: 6.3 % (ref 5–12)
NEUTROPHILS NFR BLD AUTO: 7.46 10*3/MM3 (ref 1.7–7)
NEUTROPHILS NFR BLD AUTO: 80.1 % (ref 42.7–76)
NRBC BLD AUTO-RTO: 0 /100 WBC (ref 0–0.2)
NT-PROBNP SERPL-MCNC: ABNORMAL PG/ML (ref 0–1800)
PLATELET # BLD AUTO: 380 10*3/MM3 (ref 140–450)
PMV BLD AUTO: 12.2 FL (ref 6–12)
POTASSIUM SERPL-SCNC: 3.3 MMOL/L (ref 3.5–5.2)
PROT SERPL-MCNC: 6.1 G/DL (ref 6–8.5)
RBC # BLD AUTO: 5.38 10*6/MM3 (ref 4.14–5.8)
SODIUM SERPL-SCNC: 141 MMOL/L (ref 136–145)
WBC NRBC COR # BLD AUTO: 9.31 10*3/MM3 (ref 3.4–10.8)

## 2024-05-21 PROCEDURE — 80053 COMPREHEN METABOLIC PANEL: CPT | Performed by: FAMILY MEDICINE

## 2024-05-21 PROCEDURE — 83735 ASSAY OF MAGNESIUM: CPT | Performed by: FAMILY MEDICINE

## 2024-05-21 PROCEDURE — 83880 ASSAY OF NATRIURETIC PEPTIDE: CPT | Performed by: FAMILY MEDICINE

## 2024-05-21 PROCEDURE — 85025 COMPLETE CBC W/AUTO DIFF WBC: CPT | Performed by: FAMILY MEDICINE

## 2024-05-24 ENCOUNTER — APPOINTMENT (OUTPATIENT)
Dept: GENERAL RADIOLOGY | Facility: HOSPITAL | Age: 79
End: 2024-05-24
Payer: OTHER GOVERNMENT

## 2024-05-24 ENCOUNTER — HOSPITAL ENCOUNTER (EMERGENCY)
Facility: HOSPITAL | Age: 79
Discharge: HOME | End: 2024-05-24
Attending: EMERGENCY MEDICINE
Payer: OTHER GOVERNMENT

## 2024-05-24 VITALS
HEIGHT: 71 IN | DIASTOLIC BLOOD PRESSURE: 85 MMHG | WEIGHT: 197.1 LBS | BODY MASS INDEX: 27.59 KG/M2 | SYSTOLIC BLOOD PRESSURE: 114 MMHG | RESPIRATION RATE: 22 BRPM | TEMPERATURE: 98 F | HEART RATE: 118 BPM | OXYGEN SATURATION: 97 %

## 2024-05-24 DIAGNOSIS — R06.02 SHORTNESS OF BREATH: Primary | ICD-10-CM

## 2024-05-24 DIAGNOSIS — F41.0 PANIC ATTACKS: ICD-10-CM

## 2024-05-24 DIAGNOSIS — R45.89 ANXIETY ABOUT HEALTH: ICD-10-CM

## 2024-05-24 LAB
ALBUMIN SERPL-MCNC: 4.4 G/DL (ref 3.5–5.2)
ALBUMIN/GLOB SERPL: 2.2 G/DL
ALP SERPL-CCNC: 158 U/L (ref 39–117)
ALT SERPL W P-5'-P-CCNC: 22 U/L (ref 1–41)
ANION GAP SERPL CALCULATED.3IONS-SCNC: 15.7 MMOL/L (ref 5–15)
AST SERPL-CCNC: 19 U/L (ref 1–40)
BASOPHILS # BLD AUTO: 0.08 10*3/MM3 (ref 0–0.2)
BASOPHILS NFR BLD AUTO: 0.8 % (ref 0–1.5)
BILIRUB SERPL-MCNC: 0.6 MG/DL (ref 0–1.2)
BUN SERPL-MCNC: 36 MG/DL (ref 8–23)
BUN/CREAT SERPL: 20.9 (ref 7–25)
CALCIUM SPEC-SCNC: 9.3 MG/DL (ref 8.6–10.5)
CHLORIDE SERPL-SCNC: 103 MMOL/L (ref 98–107)
CO2 SERPL-SCNC: 21.3 MMOL/L (ref 22–29)
CREAT SERPL-MCNC: 1.72 MG/DL (ref 0.76–1.27)
DEPRECATED RDW RBC AUTO: 53.4 FL (ref 37–54)
EGFRCR SERPLBLD CKD-EPI 2021: 40.2 ML/MIN/1.73
EOSINOPHIL # BLD AUTO: 0.07 10*3/MM3 (ref 0–0.4)
EOSINOPHIL NFR BLD AUTO: 0.7 % (ref 0.3–6.2)
ERYTHROCYTE [DISTWIDTH] IN BLOOD BY AUTOMATED COUNT: 20.4 % (ref 12.3–15.4)
GLOBULIN UR ELPH-MCNC: 2 GM/DL
GLUCOSE SERPL-MCNC: 175 MG/DL (ref 65–99)
HCT VFR BLD AUTO: 42.3 % (ref 37.5–51)
HGB BLD-MCNC: 12.7 G/DL (ref 13–17.7)
IMM GRANULOCYTES # BLD AUTO: 0.1 10*3/MM3 (ref 0–0.05)
IMM GRANULOCYTES NFR BLD AUTO: 1 % (ref 0–0.5)
LYMPHOCYTES # BLD AUTO: 1.06 10*3/MM3 (ref 0.7–3.1)
LYMPHOCYTES NFR BLD AUTO: 11 % (ref 19.6–45.3)
MCH RBC QN AUTO: 23 PG (ref 26.6–33)
MCHC RBC AUTO-ENTMCNC: 30 G/DL (ref 31.5–35.7)
MCV RBC AUTO: 76.5 FL (ref 79–97)
MONOCYTES # BLD AUTO: 0.54 10*3/MM3 (ref 0.1–0.9)
MONOCYTES NFR BLD AUTO: 5.6 % (ref 5–12)
NEUTROPHILS NFR BLD AUTO: 7.75 10*3/MM3 (ref 1.7–7)
NEUTROPHILS NFR BLD AUTO: 80.9 % (ref 42.7–76)
NRBC BLD AUTO-RTO: 0 /100 WBC (ref 0–0.2)
NT-PROBNP SERPL-MCNC: ABNORMAL PG/ML (ref 0–1800)
PLATELET # BLD AUTO: 418 10*3/MM3 (ref 140–450)
PMV BLD AUTO: 12.1 FL (ref 6–12)
POTASSIUM SERPL-SCNC: 4.5 MMOL/L (ref 3.5–5.2)
PROT SERPL-MCNC: 6.4 G/DL (ref 6–8.5)
QT INTERVAL: 354 MS
QTC INTERVAL: 495 MS
RBC # BLD AUTO: 5.53 10*6/MM3 (ref 4.14–5.8)
RBC MORPH BLD: NORMAL
SMALL PLATELETS BLD QL SMEAR: NORMAL
SODIUM SERPL-SCNC: 140 MMOL/L (ref 136–145)
TROPONIN T SERPL HS-MCNC: 60 NG/L
WBC MORPH BLD: NORMAL
WBC NRBC COR # BLD AUTO: 9.6 10*3/MM3 (ref 3.4–10.8)

## 2024-05-24 PROCEDURE — 85025 COMPLETE CBC W/AUTO DIFF WBC: CPT | Performed by: EMERGENCY MEDICINE

## 2024-05-24 PROCEDURE — 71046 X-RAY EXAM CHEST 2 VIEWS: CPT

## 2024-05-24 PROCEDURE — 99284 EMERGENCY DEPT VISIT MOD MDM: CPT

## 2024-05-24 PROCEDURE — 85007 BL SMEAR W/DIFF WBC COUNT: CPT | Performed by: EMERGENCY MEDICINE

## 2024-05-24 PROCEDURE — 83880 ASSAY OF NATRIURETIC PEPTIDE: CPT | Performed by: EMERGENCY MEDICINE

## 2024-05-24 PROCEDURE — 80053 COMPREHEN METABOLIC PANEL: CPT | Performed by: EMERGENCY MEDICINE

## 2024-05-24 PROCEDURE — 93005 ELECTROCARDIOGRAM TRACING: CPT

## 2024-05-24 PROCEDURE — 84484 ASSAY OF TROPONIN QUANT: CPT | Performed by: EMERGENCY MEDICINE

## 2024-05-24 PROCEDURE — 93005 ELECTROCARDIOGRAM TRACING: CPT | Performed by: EMERGENCY MEDICINE

## 2024-05-24 RX ORDER — SODIUM CHLORIDE 0.9 % (FLUSH) 0.9 %
10 SYRINGE (ML) INJECTION AS NEEDED
Status: DISCONTINUED | OUTPATIENT
Start: 2024-05-24 | End: 2024-05-24 | Stop reason: HOSPADM

## 2024-05-24 NOTE — ED PROVIDER NOTES
"Subjective     History provided by:  Patient and medical records    History of Present Illness    Chief complaint: Shortness of breath    Location: Lungs    Quality/Severity: The patient states \"I just cannot get my breath\".    Timing/Onset: Started 3 months ago.    Modifying Factors: The patient has a history of congestive heart failure as well as panic attacks.    Associated symptoms: States his feet have been swollen for about 3 days.  Denies any chest pain.  Reports a slight nonproductive cough.  Denies a fever.    Narrative: The patient is a 78-year-old white male complaining of shortness of breath for the last 3 months.  He has been seen numerous times in this emergency department and others for the same.  He had a CT angiogram of the chest 3/17/2024 for shortness of breath that was negative for PE.  Bilateral lower lobe pleural effusions were seen.  He was recently prescribed Lasix 40 mg.  He is on milrinone drip.  His PCP is the [facility].  His baseline heart rate for most visits has been 111-120.  He has a history of chronic kidney disease with a baseline creatinine of 39.6.  His troponins are chronically elevated at 57-61.    Review of Systems  Past Medical History:   Diagnosis Date    Alcohol withdrawal     CHF (congestive heart failure)     Chronic pain     Coronary artery disease     Diabetes mellitus     Hypertension     Metabolic encephalopathy     Myocardial infarction     Panic attacks      /85   Pulse 118   Temp 98 °F (36.7 °C) (Oral)   Resp 22   Ht 180.3 cm (71\")   Wt 89.4 kg (197 lb 1.6 oz)   SpO2 97%   BMI 27.49 kg/m²     Past Medical History:   Diagnosis Date    Alcohol withdrawal     CHF (congestive heart failure)     Chronic pain     Coronary artery disease     Diabetes mellitus     Hypertension     Metabolic encephalopathy     Myocardial infarction     Panic attacks    The primary encounter diagnosis was Shortness of breath. Diagnoses of Anxiety about health and Panic " attacks were also pertinent to this visit.  XR Chest 2 View   Final Result   Impression:   Cardiomegaly with mild pulmonary edema.         Electronically Signed: Levy Henriquez MD     2024 2:51 AM EDT     Workstation ID: [id number]        Final diagnoses:   Shortness of breath   Anxiety about health   Panic attacks         Allergies   Allergen Reactions    Gemfibrozil Other (See Comments)    Simvastatin Other (See Comments)       Past Surgical History:   Procedure Laterality Date    BACK SURGERY      CORONARY ANGIOPLASTY WITH STENT PLACEMENT      2 stents    HERNIA REPAIR      PROSTATECTOMY      SKIN CANCER EXCISION         No family history on file.    Social History     Socioeconomic History    Marital status:    Tobacco Use    Smoking status: Former     Current packs/day: 0.00     Average packs/day: 1 pack/day for 25.0 years (25.0 ttl pk-yrs)     Types: Cigarettes     Start date: 1999     Quit date: 2024     Years since quittin.2    Smokeless tobacco: Never   Vaping Use    Vaping status: Never Used   Substance and Sexual Activity    Alcohol use: Not Currently     Comment: drinks 6-7 shots per day, vodka, fireball    Drug use: Yes     Types: Hydrocodone    Sexual activity: Defer           Objective   Physical Exam  Vitals and nursing note reviewed.   Constitutional:       General: He is not in acute distress.     Appearance: He is well-developed. He is not ill-appearing, toxic-appearing or diaphoretic.      Comments: The patient is an anxious affect.  He does not appear in acute distress or toxic.  Review of his vital signs: He is afebrile temperature 98, tachypnea with a respiratory rate 22 with a room air oxygen saturation of 96%, tachycardic with a heart rate of 115, blood pressure normal 114/85.   HENT:      Head: Normocephalic and atraumatic.      Nose: Nose normal.      Mouth/Throat:      Pharynx: No oropharyngeal exudate.   Eyes:      General: No scleral icterus.        Right eye: No  discharge.         Left eye: No discharge.      Pupils: Pupils are equal, round, and reactive to light.   Neck:      Thyroid: No thyromegaly.      Vascular: No JVD.   Cardiovascular:      Rate and Rhythm: Normal rate and regular rhythm.      Heart sounds: Normal heart sounds. No murmur heard.  Pulmonary:      Effort: Tachypnea present.      Breath sounds: Normal breath sounds. No stridor. No decreased breath sounds, wheezing, rhonchi or rales.   Chest:      Chest wall: No tenderness.   Abdominal:      General: Bowel sounds are normal. There is no distension.      Palpations: Abdomen is soft.      Tenderness: There is no abdominal tenderness.   Musculoskeletal:         General: No tenderness or deformity. Normal range of motion.      Cervical back: Normal range of motion and neck supple.      Right lower leg: Edema (1+) present.      Left lower leg: Edema (1+) present.   Lymphadenopathy:      Cervical: No cervical adenopathy.   Skin:     General: Skin is warm and dry.      Capillary Refill: Capillary refill takes less than 2 seconds.      Findings: No rash.   Neurological:      General: No focal deficit present.      Mental Status: He is alert and oriented to person, place, and time.      Cranial Nerves: No cranial nerve deficit.      Coordination: Coordination normal.      Comments: No focal motor sensory deficit   Psychiatric:         Mood and Affect: Mood is anxious.         Thought Content: Thought content normal.         Judgment: Judgment normal.         Procedures           ED Course  ED Course as of 05/24/24 0341   Fri May 24, 2024   0221 My interpretation of the patient's EKG tracing performed 01: 42 is sinus tachycardia with a rate of 117, approximately 90 degree axis, old inferior infarct, no acute ST segment elevation or depression consistent with ischemia, lateral inverted T waves is unchanged in comparison to tracing 5/12/2024, no ectopy, probable left atrial enlargement, normal NC and QT intervals.  [TP]   0319 Reviewed the patient's laboratory studies: The patient's high sensitive troponin is elevated at 60 which is baseline for the patient.  proBNP is elevated to 18,227 which is about baseline for the patient.  CBC has a normal white count of 9.6 with a slight left shift.  Hemoglobin slightly low at 12.7 normal hematocrit of 42.3.  Platelets are normal.  CMP has normal sodium potassium with an elevated glucose of 175.  The patient's creatinine is elevated at 1.72 with a low GFR of 40.2 was about baseline for the patient.  His BUN is elevated at 36 which is higher than baseline.  His alk phos is elevated at 158, remainder of liver enzymes within normal limits. [TP]   0321 The patient's chest x-ray is interpreted by me and the radiologist as cardiomegaly with mild pulmonary edema. [TP]   0321 Is my impression the patient's shortness of breath is primarily due to anxiety and panic.  He does have some mild congestive heart failure, and that his oxygen saturation is normal with a room air oxygen is 98% and normal the last time I was in the room with him.  His lungs are clear to auscultation on exam.  At this point due to his elevated BUN, I would not increase his diuretics for his congestive heart failure. [TP]      ED Course User Index  [TP] Porfirio Telles MD                                             Medical Decision Making  My differential diagnosis for dyspnea includes but is not limited to:  Asthma, COPD, pneumonia, pulmonary embolus, acute respiratory distress syndrome, pneumothorax, pleural effusion, pulmonary fibrosis, congestive heart failure, myocardial infarction, DKA, uremia, acidosis, sepsis, anemia, drug related, hyperventilation, CNS disease     Problems Addressed:  Anxiety about health: complicated acute illness or injury  Panic attacks: complicated acute illness or injury  Shortness of breath: complicated acute illness or injury    Amount and/or Complexity of Data Reviewed  Labs: ordered.  Decision-making details documented in ED Course.  Radiology: ordered. Decision-making details documented in ED Course.  ECG/medicine tests: ordered and independent interpretation performed. Decision-making details documented in ED Course.    Risk  Prescription drug management.        Final diagnoses:   Shortness of breath   Anxiety about health   Panic attacks       ED Disposition  ED Disposition       ED Disposition   Discharge    Condition   Stable    Comment   --               Your doctor at the [facility]    Schedule an appointment as soon as possible for a visit   next available         Medication List      No changes were made to your prescriptions during this visit.           Labs Reviewed   COMPREHENSIVE METABOLIC PANEL - Abnormal; Notable for the following components:       Result Value    Glucose 175 (*)     BUN 36 (*)     Creatinine 1.72 (*)     CO2 21.3 (*)     Alkaline Phosphatase 158 (*)     Anion Gap 15.7 (*)     eGFR 40.2 (*)     All other components within normal limits    Narrative:     GFR Normal >60  Chronic Kidney Disease <60  Kidney Failure <15    The GFR formula is only valid for adults with stable renal function between ages 18 and 70.   BNP (IN-HOUSE) - Abnormal; Notable for the following components:    proBNP 18,227.0 (*)     All other components within normal limits    Narrative:     This assay is used as an aid in the diagnosis of individuals suspected of having heart failure. It can be used as an aid in the diagnosis of acute decompensated heart failure (ADHF) in patients presenting with signs and symptoms of ADHF to the emergency department (ED). In addition, NT-proBNP of <300 pg/mL indicates ADHF is not likely.    Age Range Result Interpretation  NT-proBNP Concentration (pg/mL:      <50             Positive            >450                   Gray                 300-450                    Negative             <300    50-75           Positive            >900                  Lainez                 300-900                  Negative            <300      >75             Positive            >1800                  Gray                300-1800                  Negative            <300   TROPONIN - Abnormal; Notable for the following components:    HS Troponin T 60 (*)     All other components within normal limits    Narrative:     High Sensitive Troponin T Reference Range:  <14.0 ng/L- Negative Female for AMI  <22.0 ng/L- Negative Male for AMI  >=14 - Abnormal Female indicating possible myocardial injury.  >=22 - Abnormal Male indicating possible myocardial injury.   Clinicians would have to utilize clinical acumen, EKG, Troponin, and serial changes to determine if it is an Acute Myocardial Infarction or myocardial injury due to an underlying chronic condition.        CBC WITH AUTO DIFFERENTIAL - Abnormal; Notable for the following components:    Hemoglobin 12.7 (*)     MCV 76.5 (*)     MCH 23.0 (*)     MCHC 30.0 (*)     RDW 20.4 (*)     MPV 12.1 (*)     Neutrophil % 80.9 (*)     Lymphocyte % 11.0 (*)     Immature Grans % 1.0 (*)     Neutrophils, Absolute 7.75 (*)     Immature Grans, Absolute 0.10 (*)     All other components within normal limits   SCAN SLIDE   HIGH SENSITIVITIY TROPONIN T 2HR   CBC AND DIFFERENTIAL    Narrative:     The following orders were created for panel order CBC & Differential.  Procedure                               Abnormality         Status                     ---------                               -----------         ------                     CBC Auto Differential[[id number]]        Abnormal            Final result               Scan Slide[[id number]]                                       Final result                 Please view results for these tests on the individual orders.     XR Chest 2 View   Final Result   Impression:   Cardiomegaly with mild pulmonary edema.         Electronically Signed: Levy Henriquez MD     5/24/2024 2:51 AM EDT     Workstation ID: [id number]              Medication List      No changes were made to your prescriptions during this visit.              Porfirio Telles MD  05/24/24 5315

## 2024-05-28 LAB
QT INTERVAL: 354 MS
QTC INTERVAL: 495 MS

## 2024-05-29 ENCOUNTER — LAB REQUISITION (OUTPATIENT)
Dept: LAB | Facility: HOSPITAL | Age: 79
End: 2024-05-29

## 2024-05-29 DIAGNOSIS — Z51.81 ENCOUNTER FOR THERAPEUTIC DRUG LEVEL MONITORING: ICD-10-CM

## 2024-05-29 DIAGNOSIS — Z45.2 ENCOUNTER FOR ADJUSTMENT AND MANAGEMENT OF VASCULAR ACCESS DEVICE: ICD-10-CM

## 2024-05-29 DIAGNOSIS — I50.23 ACUTE ON CHRONIC SYSTOLIC (CONGESTIVE) HEART FAILURE: ICD-10-CM

## 2024-05-29 LAB
ALBUMIN SERPL-MCNC: 4.2 G/DL (ref 3.5–5.2)
ALBUMIN/GLOB SERPL: 2 G/DL
ALP SERPL-CCNC: 156 U/L (ref 39–117)
ALT SERPL W P-5'-P-CCNC: 21 U/L (ref 1–41)
ANION GAP SERPL CALCULATED.3IONS-SCNC: 13.8 MMOL/L (ref 5–15)
AST SERPL-CCNC: 25 U/L (ref 1–40)
BASOPHILS # BLD AUTO: 0.1 10*3/MM3 (ref 0–0.2)
BASOPHILS NFR BLD AUTO: 1.2 % (ref 0–1.5)
BILIRUB SERPL-MCNC: 0.5 MG/DL (ref 0–1.2)
BUN SERPL-MCNC: 30 MG/DL (ref 8–23)
BUN/CREAT SERPL: 16.9 (ref 7–25)
CALCIUM SPEC-SCNC: 9.5 MG/DL (ref 8.6–10.5)
CHLORIDE SERPL-SCNC: 102 MMOL/L (ref 98–107)
CO2 SERPL-SCNC: 23.2 MMOL/L (ref 22–29)
CREAT SERPL-MCNC: 1.78 MG/DL (ref 0.76–1.27)
DEPRECATED RDW RBC AUTO: 54.4 FL (ref 37–54)
EGFRCR SERPLBLD CKD-EPI 2021: 38.6 ML/MIN/1.73
EOSINOPHIL # BLD AUTO: 0.09 10*3/MM3 (ref 0–0.4)
EOSINOPHIL NFR BLD AUTO: 1.1 % (ref 0.3–6.2)
ERYTHROCYTE [DISTWIDTH] IN BLOOD BY AUTOMATED COUNT: 20.5 % (ref 12.3–15.4)
GLOBULIN UR ELPH-MCNC: 2.1 GM/DL
GLUCOSE SERPL-MCNC: 195 MG/DL (ref 65–99)
HCT VFR BLD AUTO: 42.1 % (ref 37.5–51)
HGB BLD-MCNC: 12.5 G/DL (ref 13–17.7)
IMM GRANULOCYTES # BLD AUTO: 0.07 10*3/MM3 (ref 0–0.05)
IMM GRANULOCYTES NFR BLD AUTO: 0.9 % (ref 0–0.5)
LYMPHOCYTES # BLD AUTO: 0.8 10*3/MM3 (ref 0.7–3.1)
LYMPHOCYTES NFR BLD AUTO: 9.9 % (ref 19.6–45.3)
MAGNESIUM SERPL-MCNC: 1.7 MG/DL (ref 1.6–2.4)
MCH RBC QN AUTO: 23 PG (ref 26.6–33)
MCHC RBC AUTO-ENTMCNC: 29.7 G/DL (ref 31.5–35.7)
MCV RBC AUTO: 77.5 FL (ref 79–97)
MONOCYTES # BLD AUTO: 0.46 10*3/MM3 (ref 0.1–0.9)
MONOCYTES NFR BLD AUTO: 5.7 % (ref 5–12)
NEUTROPHILS NFR BLD AUTO: 6.55 10*3/MM3 (ref 1.7–7)
NEUTROPHILS NFR BLD AUTO: 81.2 % (ref 42.7–76)
NRBC BLD AUTO-RTO: 0 /100 WBC (ref 0–0.2)
NT-PROBNP SERPL-MCNC: ABNORMAL PG/ML (ref 0–1800)
PLATELET # BLD AUTO: 424 10*3/MM3 (ref 140–450)
PMV BLD AUTO: 12.6 FL (ref 6–12)
POTASSIUM SERPL-SCNC: 4.2 MMOL/L (ref 3.5–5.2)
PROT SERPL-MCNC: 6.3 G/DL (ref 6–8.5)
RBC # BLD AUTO: 5.43 10*6/MM3 (ref 4.14–5.8)
SODIUM SERPL-SCNC: 139 MMOL/L (ref 136–145)
WBC NRBC COR # BLD AUTO: 8.07 10*3/MM3 (ref 3.4–10.8)

## 2024-05-29 PROCEDURE — 85025 COMPLETE CBC W/AUTO DIFF WBC: CPT | Performed by: FAMILY MEDICINE

## 2024-05-29 PROCEDURE — 80053 COMPREHEN METABOLIC PANEL: CPT | Performed by: FAMILY MEDICINE

## 2024-05-29 PROCEDURE — 83880 ASSAY OF NATRIURETIC PEPTIDE: CPT | Performed by: FAMILY MEDICINE

## 2024-05-29 PROCEDURE — 83735 ASSAY OF MAGNESIUM: CPT | Performed by: FAMILY MEDICINE

## 2024-06-04 ENCOUNTER — LAB REQUISITION (OUTPATIENT)
Dept: LAB | Facility: HOSPITAL | Age: 79
End: 2024-06-04

## 2024-06-04 DIAGNOSIS — N18.2 CHRONIC KIDNEY DISEASE, STAGE 2 (MILD): ICD-10-CM

## 2024-06-04 DIAGNOSIS — Z79.899 OTHER LONG TERM (CURRENT) DRUG THERAPY: ICD-10-CM

## 2024-06-04 DIAGNOSIS — I50.23 ACUTE ON CHRONIC SYSTOLIC (CONGESTIVE) HEART FAILURE: ICD-10-CM

## 2024-06-04 LAB
ALBUMIN SERPL-MCNC: 4.1 G/DL (ref 3.5–5.2)
ALBUMIN/GLOB SERPL: 1.9 G/DL
ALP SERPL-CCNC: 144 U/L (ref 39–117)
ALT SERPL W P-5'-P-CCNC: 18 U/L (ref 1–41)
ANION GAP SERPL CALCULATED.3IONS-SCNC: 15.7 MMOL/L (ref 5–15)
AST SERPL-CCNC: 17 U/L (ref 1–40)
BASOPHILS # BLD AUTO: 0.08 10*3/MM3 (ref 0–0.2)
BASOPHILS NFR BLD AUTO: 1 % (ref 0–1.5)
BILIRUB SERPL-MCNC: 0.7 MG/DL (ref 0–1.2)
BUN SERPL-MCNC: 25 MG/DL (ref 8–23)
BUN/CREAT SERPL: 13.2 (ref 7–25)
CALCIUM SPEC-SCNC: 9.4 MG/DL (ref 8.6–10.5)
CHLORIDE SERPL-SCNC: 97 MMOL/L (ref 98–107)
CO2 SERPL-SCNC: 27.3 MMOL/L (ref 22–29)
CREAT SERPL-MCNC: 1.9 MG/DL (ref 0.76–1.27)
DEPRECATED RDW RBC AUTO: 53.1 FL (ref 37–54)
EGFRCR SERPLBLD CKD-EPI 2021: 35.7 ML/MIN/1.73
EOSINOPHIL # BLD AUTO: 0.08 10*3/MM3 (ref 0–0.4)
EOSINOPHIL NFR BLD AUTO: 1 % (ref 0.3–6.2)
ERYTHROCYTE [DISTWIDTH] IN BLOOD BY AUTOMATED COUNT: 20.3 % (ref 12.3–15.4)
GLOBULIN UR ELPH-MCNC: 2.2 GM/DL
GLUCOSE SERPL-MCNC: 184 MG/DL (ref 65–99)
HCT VFR BLD AUTO: 44.2 % (ref 37.5–51)
HGB BLD-MCNC: 13.4 G/DL (ref 13–17.7)
IMM GRANULOCYTES # BLD AUTO: 0.06 10*3/MM3 (ref 0–0.05)
IMM GRANULOCYTES NFR BLD AUTO: 0.8 % (ref 0–0.5)
LYMPHOCYTES # BLD AUTO: 0.93 10*3/MM3 (ref 0.7–3.1)
LYMPHOCYTES NFR BLD AUTO: 12.2 % (ref 19.6–45.3)
MAGNESIUM SERPL-MCNC: 1.5 MG/DL (ref 1.6–2.4)
MCH RBC QN AUTO: 23.4 PG (ref 26.6–33)
MCHC RBC AUTO-ENTMCNC: 30.3 G/DL (ref 31.5–35.7)
MCV RBC AUTO: 77.1 FL (ref 79–97)
MONOCYTES # BLD AUTO: 0.5 10*3/MM3 (ref 0.1–0.9)
MONOCYTES NFR BLD AUTO: 6.6 % (ref 5–12)
NEUTROPHILS NFR BLD AUTO: 5.98 10*3/MM3 (ref 1.7–7)
NEUTROPHILS NFR BLD AUTO: 78.4 % (ref 42.7–76)
NT-PROBNP SERPL-MCNC: ABNORMAL PG/ML (ref 0–1800)
PLATELET # BLD AUTO: 423 10*3/MM3 (ref 140–450)
PMV BLD AUTO: 12 FL (ref 6–12)
POTASSIUM SERPL-SCNC: 3.6 MMOL/L (ref 3.5–5.2)
PROT SERPL-MCNC: 6.3 G/DL (ref 6–8.5)
RBC # BLD AUTO: 5.73 10*6/MM3 (ref 4.14–5.8)
SODIUM SERPL-SCNC: 140 MMOL/L (ref 136–145)
WBC NRBC COR # BLD AUTO: 7.63 10*3/MM3 (ref 3.4–10.8)

## 2024-06-04 PROCEDURE — 83880 ASSAY OF NATRIURETIC PEPTIDE: CPT | Performed by: FAMILY MEDICINE

## 2024-06-04 PROCEDURE — 80053 COMPREHEN METABOLIC PANEL: CPT | Performed by: FAMILY MEDICINE

## 2024-06-04 PROCEDURE — 83735 ASSAY OF MAGNESIUM: CPT | Performed by: FAMILY MEDICINE

## 2024-06-04 PROCEDURE — 85025 COMPLETE CBC W/AUTO DIFF WBC: CPT | Performed by: FAMILY MEDICINE

## 2024-06-12 ENCOUNTER — LAB REQUISITION (OUTPATIENT)
Dept: LAB | Facility: HOSPITAL | Age: 79
End: 2024-06-12

## 2024-06-12 DIAGNOSIS — Z45.2 ENCOUNTER FOR ADJUSTMENT AND MANAGEMENT OF VASCULAR ACCESS DEVICE: ICD-10-CM

## 2024-06-12 DIAGNOSIS — I13.0 HYPERTENSIVE HEART AND CHRONIC KIDNEY DISEASE WITH HEART FAILURE AND STAGE 1 THROUGH STAGE 4 CHRONIC KIDNEY DISEASE, OR UNSPECIFIED CHRONIC KIDNEY DISEASE: ICD-10-CM

## 2024-06-12 LAB
ALBUMIN SERPL-MCNC: 4.4 G/DL (ref 3.5–5.2)
ALBUMIN/GLOB SERPL: 2.1 G/DL
ALP SERPL-CCNC: 269 U/L (ref 39–117)
ALT SERPL W P-5'-P-CCNC: 50 U/L (ref 1–41)
ANION GAP SERPL CALCULATED.3IONS-SCNC: 20.6 MMOL/L (ref 5–15)
AST SERPL-CCNC: 38 U/L (ref 1–40)
BASOPHILS # BLD AUTO: 0.08 10*3/MM3 (ref 0–0.2)
BASOPHILS NFR BLD AUTO: 0.9 % (ref 0–1.5)
BILIRUB SERPL-MCNC: 0.7 MG/DL (ref 0–1.2)
BUN SERPL-MCNC: 50 MG/DL (ref 8–23)
BUN/CREAT SERPL: 22.6 (ref 7–25)
CALCIUM SPEC-SCNC: 10.1 MG/DL (ref 8.6–10.5)
CHLORIDE SERPL-SCNC: 101 MMOL/L (ref 98–107)
CO2 SERPL-SCNC: 17.4 MMOL/L (ref 22–29)
CREAT SERPL-MCNC: 2.21 MG/DL (ref 0.76–1.27)
DEPRECATED RDW RBC AUTO: 62.4 FL (ref 37–54)
EGFRCR SERPLBLD CKD-EPI 2021: 29.7 ML/MIN/1.73
EOSINOPHIL # BLD AUTO: 0.12 10*3/MM3 (ref 0–0.4)
EOSINOPHIL NFR BLD AUTO: 1.4 % (ref 0.3–6.2)
ERYTHROCYTE [DISTWIDTH] IN BLOOD BY AUTOMATED COUNT: 23.1 % (ref 12.3–15.4)
GLOBULIN UR ELPH-MCNC: 2.1 GM/DL
GLUCOSE SERPL-MCNC: 158 MG/DL (ref 65–99)
HCT VFR BLD AUTO: 44 % (ref 37.5–51)
HGB BLD-MCNC: 13.5 G/DL (ref 13–17.7)
IMM GRANULOCYTES # BLD AUTO: 0.14 10*3/MM3 (ref 0–0.05)
IMM GRANULOCYTES NFR BLD AUTO: 1.6 % (ref 0–0.5)
LYMPHOCYTES # BLD AUTO: 0.97 10*3/MM3 (ref 0.7–3.1)
LYMPHOCYTES NFR BLD AUTO: 11.1 % (ref 19.6–45.3)
MAGNESIUM SERPL-MCNC: 1.8 MG/DL (ref 1.6–2.4)
MCH RBC QN AUTO: 24.4 PG (ref 26.6–33)
MCHC RBC AUTO-ENTMCNC: 30.7 G/DL (ref 31.5–35.7)
MCV RBC AUTO: 79.4 FL (ref 79–97)
MONOCYTES # BLD AUTO: 0.65 10*3/MM3 (ref 0.1–0.9)
MONOCYTES NFR BLD AUTO: 7.4 % (ref 5–12)
NEUTROPHILS NFR BLD AUTO: 6.81 10*3/MM3 (ref 1.7–7)
NEUTROPHILS NFR BLD AUTO: 77.6 % (ref 42.7–76)
NRBC BLD AUTO-RTO: 0 /100 WBC (ref 0–0.2)
NT-PROBNP SERPL-MCNC: ABNORMAL PG/ML (ref 0–1800)
PLATELET # BLD AUTO: 331 10*3/MM3 (ref 140–450)
PMV BLD AUTO: 12.8 FL (ref 6–12)
POTASSIUM SERPL-SCNC: 4.3 MMOL/L (ref 3.5–5.2)
PROT SERPL-MCNC: 6.5 G/DL (ref 6–8.5)
RBC # BLD AUTO: 5.54 10*6/MM3 (ref 4.14–5.8)
SODIUM SERPL-SCNC: 139 MMOL/L (ref 136–145)
WBC NRBC COR # BLD AUTO: 8.77 10*3/MM3 (ref 3.4–10.8)

## 2024-06-12 PROCEDURE — 83880 ASSAY OF NATRIURETIC PEPTIDE: CPT | Performed by: FAMILY MEDICINE

## 2024-06-12 PROCEDURE — 80053 COMPREHEN METABOLIC PANEL: CPT | Performed by: FAMILY MEDICINE

## 2024-06-12 PROCEDURE — 85025 COMPLETE CBC W/AUTO DIFF WBC: CPT | Performed by: FAMILY MEDICINE

## 2024-06-12 PROCEDURE — 83735 ASSAY OF MAGNESIUM: CPT | Performed by: FAMILY MEDICINE
